# Patient Record
Sex: MALE | Race: WHITE | NOT HISPANIC OR LATINO | Employment: OTHER | URBAN - NONMETROPOLITAN AREA
[De-identification: names, ages, dates, MRNs, and addresses within clinical notes are randomized per-mention and may not be internally consistent; named-entity substitution may affect disease eponyms.]

---

## 2023-03-02 LAB — NATRIURETIC PEPTIDE B (PG/ML) IN SER/PLAS: 25 PG/ML (ref 0–99)

## 2023-04-04 LAB
ANION GAP IN SER/PLAS: 13 MMOL/L (ref 10–20)
BASOPHILS (10*3/UL) IN BLOOD BY AUTOMATED COUNT: 0.09 X10E9/L (ref 0–0.1)
BASOPHILS/100 LEUKOCYTES IN BLOOD BY AUTOMATED COUNT: 0.8 % (ref 0–2)
CALCIUM (MG/DL) IN SER/PLAS: 10.1 MG/DL (ref 8.6–10.3)
CARBON DIOXIDE, TOTAL (MMOL/L) IN SER/PLAS: 26 MMOL/L (ref 21–32)
CHLORIDE (MMOL/L) IN SER/PLAS: 100 MMOL/L (ref 98–107)
CREATININE (MG/DL) IN SER/PLAS: 0.93 MG/DL (ref 0.5–1.3)
EOSINOPHILS (10*3/UL) IN BLOOD BY AUTOMATED COUNT: 0.77 X10E9/L (ref 0–0.7)
EOSINOPHILS/100 LEUKOCYTES IN BLOOD BY AUTOMATED COUNT: 6.6 % (ref 0–6)
ERYTHROCYTE DISTRIBUTION WIDTH (RATIO) BY AUTOMATED COUNT: 13.2 % (ref 11.5–14.5)
ERYTHROCYTE MEAN CORPUSCULAR HEMOGLOBIN CONCENTRATION (G/DL) BY AUTOMATED: 31.8 G/DL (ref 32–36)
ERYTHROCYTE MEAN CORPUSCULAR VOLUME (FL) BY AUTOMATED COUNT: 89 FL (ref 80–100)
ERYTHROCYTES (10*6/UL) IN BLOOD BY AUTOMATED COUNT: 4.83 X10E12/L (ref 4.5–5.9)
GFR MALE: >90 ML/MIN/1.73M2
GLUCOSE (MG/DL) IN SER/PLAS: 246 MG/DL (ref 74–99)
HEMATOCRIT (%) IN BLOOD BY AUTOMATED COUNT: 43.1 % (ref 41–52)
HEMOGLOBIN (G/DL) IN BLOOD: 13.7 G/DL (ref 13.5–17.5)
IMMATURE GRANULOCYTES/100 LEUKOCYTES IN BLOOD BY AUTOMATED COUNT: 0.4 % (ref 0–0.9)
LEUKOCYTES (10*3/UL) IN BLOOD BY AUTOMATED COUNT: 11.6 X10E9/L (ref 4.4–11.3)
LYMPHOCYTES (10*3/UL) IN BLOOD BY AUTOMATED COUNT: 3.91 X10E9/L (ref 1.2–4.8)
LYMPHOCYTES/100 LEUKOCYTES IN BLOOD BY AUTOMATED COUNT: 33.6 % (ref 13–44)
MONOCYTES (10*3/UL) IN BLOOD BY AUTOMATED COUNT: 0.79 X10E9/L (ref 0.1–1)
MONOCYTES/100 LEUKOCYTES IN BLOOD BY AUTOMATED COUNT: 6.8 % (ref 2–10)
NEUTROPHILS (10*3/UL) IN BLOOD BY AUTOMATED COUNT: 6.03 X10E9/L (ref 1.2–7.7)
NEUTROPHILS/100 LEUKOCYTES IN BLOOD BY AUTOMATED COUNT: 51.8 % (ref 40–80)
PLATELETS (10*3/UL) IN BLOOD AUTOMATED COUNT: 237 X10E9/L (ref 150–450)
POTASSIUM (MMOL/L) IN SER/PLAS: 3.8 MMOL/L (ref 3.5–5.3)
SODIUM (MMOL/L) IN SER/PLAS: 135 MMOL/L (ref 136–145)
UREA NITROGEN (MG/DL) IN SER/PLAS: 19 MG/DL (ref 6–23)

## 2023-04-06 ENCOUNTER — HOSPITAL ENCOUNTER (OUTPATIENT)
Dept: DATA CONVERSION | Facility: HOSPITAL | Age: 63
End: 2023-04-06
Attending: STUDENT IN AN ORGANIZED HEALTH CARE EDUCATION/TRAINING PROGRAM
Payer: COMMERCIAL

## 2023-04-06 DIAGNOSIS — R06.09 OTHER FORMS OF DYSPNEA: ICD-10-CM

## 2023-04-06 DIAGNOSIS — I25.10 ATHEROSCLEROTIC HEART DISEASE OF NATIVE CORONARY ARTERY WITHOUT ANGINA PECTORIS: ICD-10-CM

## 2023-04-06 DIAGNOSIS — E66.9 OBESITY, UNSPECIFIED: ICD-10-CM

## 2023-04-06 DIAGNOSIS — R07.9 CHEST PAIN, UNSPECIFIED: ICD-10-CM

## 2023-04-10 DIAGNOSIS — E78.5 HYPERLIPIDEMIA, UNSPECIFIED HYPERLIPIDEMIA TYPE: ICD-10-CM

## 2023-04-10 RX ORDER — ATORVASTATIN CALCIUM 40 MG/1
40 TABLET, FILM COATED ORAL DAILY
COMMUNITY
End: 2023-04-10 | Stop reason: SDUPTHER

## 2023-04-10 RX ORDER — ATORVASTATIN CALCIUM 40 MG/1
40 TABLET, FILM COATED ORAL DAILY
Qty: 90 TABLET | Refills: 1 | Status: SHIPPED | OUTPATIENT
Start: 2023-04-10 | End: 2023-10-06

## 2023-04-11 DIAGNOSIS — I10 HTN (HYPERTENSION), BENIGN: Primary | ICD-10-CM

## 2023-04-17 RX ORDER — AMLODIPINE BESYLATE 10 MG/1
TABLET ORAL
Qty: 90 TABLET | Refills: 1 | Status: SHIPPED | OUTPATIENT
Start: 2023-04-17 | End: 2023-10-09

## 2023-04-25 DIAGNOSIS — I15.9 SECONDARY HYPERTENSION: Primary | ICD-10-CM

## 2023-04-25 RX ORDER — METOPROLOL SUCCINATE 50 MG/1
50 TABLET, EXTENDED RELEASE ORAL DAILY
Qty: 30 TABLET | Refills: 5 | Status: SHIPPED | OUTPATIENT
Start: 2023-04-25 | End: 2023-10-23 | Stop reason: SDUPTHER

## 2023-04-25 RX ORDER — HYDROCHLOROTHIAZIDE 25 MG/1
25 TABLET ORAL DAILY
Qty: 30 TABLET | Refills: 5 | Status: SHIPPED | OUTPATIENT
Start: 2023-04-25 | End: 2023-10-23 | Stop reason: SDUPTHER

## 2023-04-25 RX ORDER — METOPROLOL SUCCINATE 50 MG/1
50 TABLET, EXTENDED RELEASE ORAL DAILY
COMMUNITY
End: 2023-04-25 | Stop reason: SDUPTHER

## 2023-04-25 RX ORDER — HYDROCHLOROTHIAZIDE 25 MG/1
25 TABLET ORAL DAILY
COMMUNITY
End: 2023-04-25 | Stop reason: SDUPTHER

## 2023-05-09 DIAGNOSIS — E11.9 TYPE 2 DIABETES MELLITUS WITHOUT COMPLICATION, WITHOUT LONG-TERM CURRENT USE OF INSULIN (MULTI): Primary | ICD-10-CM

## 2023-05-09 RX ORDER — METFORMIN HYDROCHLORIDE 500 MG/1
2 TABLET ORAL 2 TIMES DAILY
COMMUNITY
Start: 2022-05-10 | End: 2023-05-09 | Stop reason: SDUPTHER

## 2023-05-09 RX ORDER — METFORMIN HYDROCHLORIDE 500 MG/1
1000 TABLET ORAL 2 TIMES DAILY
Qty: 270 TABLET | Refills: 1 | Status: SHIPPED | OUTPATIENT
Start: 2023-05-09 | End: 2023-09-18

## 2023-06-21 DIAGNOSIS — E11.9 TYPE 2 DIABETES MELLITUS WITHOUT COMPLICATION, WITHOUT LONG-TERM CURRENT USE OF INSULIN (MULTI): Primary | ICD-10-CM

## 2023-06-21 RX ORDER — GLIPIZIDE 5 MG/1
5 TABLET, FILM COATED, EXTENDED RELEASE ORAL NIGHTLY
COMMUNITY
End: 2023-06-21 | Stop reason: SDUPTHER

## 2023-06-21 RX ORDER — GLIPIZIDE 10 MG/1
10 TABLET, FILM COATED, EXTENDED RELEASE ORAL DAILY
Qty: 90 TABLET | Refills: 1 | Status: SHIPPED | OUTPATIENT
Start: 2023-06-21 | End: 2023-07-17 | Stop reason: ALTCHOICE

## 2023-06-21 RX ORDER — GLIPIZIDE 5 MG/1
5 TABLET, FILM COATED, EXTENDED RELEASE ORAL NIGHTLY
Qty: 90 TABLET | Refills: 0 | Status: SHIPPED | OUTPATIENT
Start: 2023-06-21 | End: 2023-07-17 | Stop reason: ALTCHOICE

## 2023-06-21 RX ORDER — GLIPIZIDE 10 MG/1
10 TABLET, FILM COATED, EXTENDED RELEASE ORAL DAILY
COMMUNITY
End: 2023-06-21 | Stop reason: SDUPTHER

## 2023-07-06 LAB
ANION GAP IN SER/PLAS: 12 MMOL/L (ref 10–20)
CALCIUM (MG/DL) IN SER/PLAS: 9.1 MG/DL (ref 8.6–10.3)
CARBON DIOXIDE, TOTAL (MMOL/L) IN SER/PLAS: 26 MMOL/L (ref 21–32)
CHLORIDE (MMOL/L) IN SER/PLAS: 102 MMOL/L (ref 98–107)
CHOLESTEROL (MG/DL) IN SER/PLAS: 135 MG/DL (ref 0–199)
CHOLESTEROL IN HDL (MG/DL) IN SER/PLAS: 44 MG/DL
CHOLESTEROL/HDL RATIO: 3.1
CREATININE (MG/DL) IN SER/PLAS: 0.81 MG/DL (ref 0.5–1.3)
ESTIMATED AVERAGE GLUCOSE FOR HBA1C: 206 MG/DL
GFR MALE: >90 ML/MIN/1.73M2
GLUCOSE (MG/DL) IN SER/PLAS: 183 MG/DL (ref 74–99)
HEMOGLOBIN A1C/HEMOGLOBIN TOTAL IN BLOOD: 8.8 %
LDL: 53 MG/DL (ref 0–99)
POTASSIUM (MMOL/L) IN SER/PLAS: 4 MMOL/L (ref 3.5–5.3)
SODIUM (MMOL/L) IN SER/PLAS: 136 MMOL/L (ref 136–145)
TRIGLYCERIDE (MG/DL) IN SER/PLAS: 191 MG/DL (ref 0–149)
UREA NITROGEN (MG/DL) IN SER/PLAS: 21 MG/DL (ref 6–23)
VLDL: 38 MG/DL (ref 0–40)

## 2023-07-11 ENCOUNTER — OFFICE VISIT (OUTPATIENT)
Dept: PRIMARY CARE | Facility: CLINIC | Age: 63
End: 2023-07-11
Payer: COMMERCIAL

## 2023-07-11 VITALS
HEIGHT: 70 IN | WEIGHT: 315 LBS | SYSTOLIC BLOOD PRESSURE: 132 MMHG | HEART RATE: 90 BPM | OXYGEN SATURATION: 96 % | BODY MASS INDEX: 45.1 KG/M2 | DIASTOLIC BLOOD PRESSURE: 80 MMHG

## 2023-07-11 DIAGNOSIS — E78.5 HYPERLIPIDEMIA LDL GOAL <70: ICD-10-CM

## 2023-07-11 DIAGNOSIS — E66.01 OBESITY, MORBID (MULTI): Primary | ICD-10-CM

## 2023-07-11 DIAGNOSIS — M79.89 LEFT LEG SWELLING: ICD-10-CM

## 2023-07-11 DIAGNOSIS — M25.562 ACUTE PAIN OF LEFT KNEE: ICD-10-CM

## 2023-07-11 DIAGNOSIS — E11.69 DIABETES MELLITUS TYPE 2 IN OBESE: ICD-10-CM

## 2023-07-11 DIAGNOSIS — G47.33 SEVERE OBSTRUCTIVE SLEEP APNEA: ICD-10-CM

## 2023-07-11 DIAGNOSIS — E66.9 DIABETES MELLITUS TYPE 2 IN OBESE: ICD-10-CM

## 2023-07-11 DIAGNOSIS — R06.00 DYSPNEA, UNSPECIFIED TYPE: ICD-10-CM

## 2023-07-11 DIAGNOSIS — I15.9 SECONDARY HYPERTENSION: ICD-10-CM

## 2023-07-11 DIAGNOSIS — M25.552 LEFT HIP PAIN: ICD-10-CM

## 2023-07-11 PROBLEM — R69 TAKING MEDICATION FOR CHRONIC DISEASE: Status: ACTIVE | Noted: 2023-07-11

## 2023-07-11 PROBLEM — K43.9 ABDOMINAL WALL HERNIA: Status: ACTIVE | Noted: 2023-07-11

## 2023-07-11 PROBLEM — R93.1 ELEVATED CORONARY ARTERY CALCIUM SCORE: Status: ACTIVE | Noted: 2023-07-11

## 2023-07-11 PROBLEM — I10 HTN (HYPERTENSION): Status: ACTIVE | Noted: 2023-07-11

## 2023-07-11 PROBLEM — U09.9 POST COVID-19 CONDITION, UNSPECIFIED: Status: ACTIVE | Noted: 2023-07-11

## 2023-07-11 PROCEDURE — 4010F ACE/ARB THERAPY RXD/TAKEN: CPT | Performed by: INTERNAL MEDICINE

## 2023-07-11 PROCEDURE — 3079F DIAST BP 80-89 MM HG: CPT | Performed by: INTERNAL MEDICINE

## 2023-07-11 PROCEDURE — 99214 OFFICE O/P EST MOD 30 MIN: CPT | Performed by: INTERNAL MEDICINE

## 2023-07-11 PROCEDURE — 3008F BODY MASS INDEX DOCD: CPT | Performed by: INTERNAL MEDICINE

## 2023-07-11 PROCEDURE — 3075F SYST BP GE 130 - 139MM HG: CPT | Performed by: INTERNAL MEDICINE

## 2023-07-11 PROCEDURE — 3052F HG A1C>EQUAL 8.0%<EQUAL 9.0%: CPT | Performed by: INTERNAL MEDICINE

## 2023-07-11 PROCEDURE — 1036F TOBACCO NON-USER: CPT | Performed by: INTERNAL MEDICINE

## 2023-07-11 RX ORDER — MULTIVITAMIN
1 TABLET ORAL DAILY
COMMUNITY
End: 2024-01-23 | Stop reason: SDUPTHER

## 2023-07-11 RX ORDER — LORATADINE 10 MG/1
1 CAPSULE, LIQUID FILLED ORAL DAILY
COMMUNITY

## 2023-07-11 RX ORDER — IBUPROFEN 200 MG
CAPSULE ORAL
COMMUNITY
Start: 2022-05-10

## 2023-07-11 RX ORDER — LOSARTAN POTASSIUM 50 MG/1
1 TABLET ORAL DAILY
COMMUNITY
End: 2023-09-18 | Stop reason: SDUPTHER

## 2023-07-11 ASSESSMENT — ENCOUNTER SYMPTOMS
BACK PAIN: 0
ARTHRALGIAS: 0
WHEEZING: 0
ABDOMINAL PAIN: 0
NAUSEA: 0
DIARRHEA: 0
VOMITING: 0
BLOOD IN STOOL: 0
CHEST TIGHTNESS: 0
SHORTNESS OF BREATH: 1
FATIGUE: 0
PALPITATIONS: 1
COUGH: 1
UNEXPECTED WEIGHT CHANGE: 0

## 2023-07-11 ASSESSMENT — PATIENT HEALTH QUESTIONNAIRE - PHQ9
SUM OF ALL RESPONSES TO PHQ9 QUESTIONS 1 AND 2: 0
1. LITTLE INTEREST OR PLEASURE IN DOING THINGS: NOT AT ALL
2. FEELING DOWN, DEPRESSED OR HOPELESS: NOT AT ALL

## 2023-07-11 NOTE — PATIENT INSTRUCTIONS
The staff will be scheduling a Doppler of your left lower leg today to make sure you do not have a blood clot.  If you have a blood clot you will stay at the hospital and be referred to a clinic called the Coumadin clinic to be placed on a blood thinner.  If you do not have a clot then you can leave and we will see you back to go over the results.  Please also remember to get x-rays of both your left hip and left knee which have already been ordered  The staff will be scheduling pulmonary function testing and I would like to see you back after completion  I sent a prescription to your pharmacy for a medication called Vinuvia to be taken once daily with the rest of your diabetic medications  Please call if this medication is not covered on your plan and please also contact your insurance company right away to get a list of approved medications  Because your hemoglobin A1c was high at 8.8 I would like for you to check your sugars twice daily before breakfast and before supper and write them down.  Please give me weekly updates  Bring your blood sugar log with you to your next follow-up appointment

## 2023-07-11 NOTE — ASSESSMENT & PLAN NOTE
-He indicates that he has been wearing his CPAP device faithfully and has derived benefit from its use

## 2023-07-11 NOTE — ASSESSMENT & PLAN NOTE
-Cholesterol overall is okay except for the high triglycerides  -We will work on lowering sugars and dietary modification  -He recently saw the dietitian  -He will continue with atorvastatin 40 mg daily

## 2023-07-11 NOTE — ASSESSMENT & PLAN NOTE
-Unfortunately hemoglobin A1c went up at 8.8 and our goal is to get closer to 7  -He recently saw dietitian and will be making modifications in his daily lifestyle habits  -He will continue taking metformin 500 mg 2 tablets twice daily  -He will continue with glipizide XL a total of 15 mg daily  -I am adding Januvia 100 mg daily and he will call if this is not well covered on his plan.  -He has been asked to check his sugars twice daily and give me weekly updates on his readings

## 2023-07-11 NOTE — ASSESSMENT & PLAN NOTE
-Blood pressure is currently adequately controlled  -He will continue with metoprolol succinate XL 50 mg daily  -Losartan 50 mg daily-  -

## 2023-07-11 NOTE — PROGRESS NOTES
Pt is here today for a 6 month check up,  review labs. C/O swelling in both feet and legs, left hip pain

## 2023-07-11 NOTE — ASSESSMENT & PLAN NOTE
-He had a recent thorough cardiac work-up including angiogram of his coronary vessels and he does not have severe disease to explain his dyspnea on exertion  -He has had COVID in the past  -We are setting him up for pulmonary function testing and we will see him back to go over the result

## 2023-07-11 NOTE — PROGRESS NOTES
Subjective   Patient ID: Qasim Simon is a 62 y.o. male who presents for No chief complaint on file..  HPI  He is here today for his routine 6-month checkup.  Since our last visit he had a thorough evaluation for his heart and ended up having a cardiac catheterization.  The good news is I did not find severe heart disease and has not felt that his dyspnea on exertion is secondary to his heart.  Of course we want to continue to aggressively modifying his risk factors with heart disease.  He also states he continues to have shortness of breath while at rest and also with exertion.  He states it all began when he contracted COVID-19 infection couple years ago.  I told him that the neck step would be to do pulmonary function testing and I have agreed to call him with results.  We talked about various possible causes of his shortness of breath including the possibility of reactive airways disease, the aftermath of COVID, his weight, and sometimes just deconditioning.  We also conducted a review of systems and we went over the results of recent lab work.  Unfortunately his hemoglobin A1c went up at 8.8.  We talked about the challenges of going to social events that center around food.  He has seen a dietitian recently and is planning on making modifications in his diet.  I am going to add Januvia and he will contact me if its not covered.  I am also asking that he give me regular updates on his blood glucose levels  We also reviewed his cholesterol profile and his triglycerides were high.  We talked about the association of hyperglycemia with high triglycerides.  He also presents today with swelling involving his left lower leg which is significantly different from his right.  He states it began recently and has been having pain in his left knee and hip.  I told him that because of the unilateral leg swelling we will be sending him for a venous Doppler to make sure he does not have a clot and we will also do x-rays  of the concern joints.  We will see him back in follow-up.  Review of Systems   Constitutional:  Negative for fatigue and unexpected weight change.   Respiratory:  Positive for cough and shortness of breath. Negative for chest tightness and wheezing.    Cardiovascular:  Positive for palpitations. Negative for chest pain and leg swelling.   Gastrointestinal:  Negative for abdominal pain, blood in stool, diarrhea, nausea and vomiting.   Musculoskeletal:  Negative for arthralgias and back pain.     Objective   Physical Exam  Vitals and nursing note reviewed.   Constitutional:       General: He is not in acute distress.     Appearance: Normal appearance.   HENT:      Head: Normocephalic and atraumatic.   Eyes:      Conjunctiva/sclera: Conjunctivae normal.   Cardiovascular:      Rate and Rhythm: Normal rate and regular rhythm.      Heart sounds: Normal heart sounds.   Pulmonary:      Effort: No respiratory distress.      Breath sounds: No wheezing.   Abdominal:      Palpations: Abdomen is soft.      Tenderness: There is no abdominal tenderness. There is no guarding.   Musculoskeletal:         General: No swelling. Normal range of motion.   Skin:     General: Skin is warm and dry.   Neurological:      General: No focal deficit present.      Mental Status: He is alert and oriented to person, place, and time.   Psychiatric:         Behavior: Behavior normal.       Recent Results (from the past 672 hour(s))   Lipid Panel    Collection Time: 07/06/23  8:41 AM   Result Value Ref Range    Cholesterol 135 0 - 199 mg/dL    HDL 44.0 mg/dL    Cholesterol/HDL Ratio 3.1     LDL 53 0 - 99 mg/dL    VLDL 38 0 - 40 mg/dL    Triglycerides 191 (H) 0 - 149 mg/dL   Basic Metabolic Panel    Collection Time: 07/06/23  8:41 AM   Result Value Ref Range    Glucose 183 (H) 74 - 99 mg/dL    Sodium 136 136 - 145 mmol/L    Potassium 4.0 3.5 - 5.3 mmol/L    Chloride 102 98 - 107 mmol/L    Bicarbonate 26 21 - 32 mmol/L    Anion Gap 12 10 - 20 mmol/L     Urea Nitrogen 21 6 - 23 mg/dL    Creatinine 0.81 0.50 - 1.30 mg/dL    GFR MALE >90 >90 mL/min/1.73m2    Calcium 9.1 8.6 - 10.3 mg/dL   Hemoglobin A1C    Collection Time: 07/06/23  8:41 AM   Result Value Ref Range    Hemoglobin A1C 8.8 (A) %    Estimated Average Glucose 206 MG/DL       Assessment/Plan   Problem List Items Addressed This Visit       Severe obstructive sleep apnea     -He indicates that he has been wearing his CPAP device faithfully and has derived benefit from its use         Hyperlipidemia LDL goal <70     -Cholesterol overall is okay except for the high triglycerides  -We will work on lowering sugars and dietary modification  -He recently saw the dietitian  -He will continue with atorvastatin 40 mg daily         HTN (hypertension)     -Blood pressure is currently adequately controlled  -He will continue with metoprolol succinate XL 50 mg daily  -Losartan 50 mg daily-  -           Obesity, morbid (CMS/HCC) - Primary    Diabetes mellitus type 2 in obese (CMS/[facility])     -Unfortunately hemoglobin A1c went up at 8.8 and our goal is to get closer to 7  -He recently saw dietitian and will be making modifications in his daily lifestyle habits  -He will continue taking metformin 500 mg 2 tablets twice daily  -He will continue with glipizide XL a total of 15 mg daily  -I am adding Januvia 100 mg daily and he will call if this is not well covered on his plan.  -He has been asked to check his sugars twice daily and give me weekly updates on his readings         Relevant Medications    SITagliptin phosphate (Januvia) 100 mg tablet    Other Relevant Orders    Basic Metabolic Panel    Hemoglobin A1C    Left leg swelling     -He will be sent for a venous Doppler to make sure he does not have a blood clot         Relevant Orders    Vascular US lower extremity venous duplex bilateral    Acute pain of left knee     -I am sending him for an x-ray we will see him back to go over the result         Relevant Orders    XR  knee left 1-2 views    Left hip pain     -I am sending him for an x-ray we will see him back to go the result         Relevant Orders    XR hip left 2 or 3 views    Dyspnea     -He had a recent thorough cardiac work-up including angiogram of his coronary vessels and he does not have severe disease to explain his dyspnea on exertion  -He has had COVID in the past  -We are setting him up for pulmonary function testing and we will see him back to go over the result         Relevant Orders    Pulmonary function testing (Ancillary Performed)          Ada Juarez DO

## 2023-07-13 ENCOUNTER — PATIENT MESSAGE (OUTPATIENT)
Dept: PRIMARY CARE | Facility: CLINIC | Age: 63
End: 2023-07-13
Payer: COMMERCIAL

## 2023-07-13 ENCOUNTER — TELEPHONE (OUTPATIENT)
Dept: PRIMARY CARE | Facility: CLINIC | Age: 63
End: 2023-07-13
Payer: COMMERCIAL

## 2023-07-13 DIAGNOSIS — M25.562 ACUTE PAIN OF LEFT KNEE: ICD-10-CM

## 2023-07-13 DIAGNOSIS — M25.552 LEFT HIP PAIN: Primary | ICD-10-CM

## 2023-07-13 NOTE — TELEPHONE ENCOUNTER
Pt called stating that he is not able to afford the Juniva and would like for you to send him something else in. Thank you.

## 2023-07-13 NOTE — TELEPHONE ENCOUNTER
Pt states that he spoke with his insurance and that they do not have anything else that would be compatible with this medication that they will pay. Pt states that he feels that he also doesn't need to have a PFT done again. Its quite costly for him and he states that he doesn't feel that anything has changed in the last year. He actually feels better now that he did before. He also wants to know if he could do PT first instead of going to ortho.

## 2023-07-13 NOTE — TELEPHONE ENCOUNTER
Please advise that I am sorry to hear that and the neck step is to try to get a list of approved medications through his insurance plan so that we can select one that is affordable.

## 2023-07-13 NOTE — TELEPHONE ENCOUNTER
I called and left a detailed message because I confirmed that it was his voicemail  I explained that it was very frustrating that the insurance company does not give him more direction on improved diabetic medications and asked that he try to look into a little bit more  I also told him he did not have to have pulmonary function testing if he did not want them but appear to have been a year since his last checkup and he did not seem to be getting better  I will go ahead and order physical therapy for his joint pain and I told him that if he does not get better then we can have him see orthopedics  I of course told him that if he wanted to speak to me directly I be happy to do so but I did let him know that I will be out of the office tomorrow.  Thank you

## 2023-07-14 NOTE — TELEPHONE ENCOUNTER
Pt called wondering if you would be will to let him go back to taking the glipizide 10mg in the Am and 10mg in the PM.. He states that you just recently changed it to 10mg in the AM and 5mg in the PM instead of doing the januvia.

## 2023-07-17 DIAGNOSIS — E11.9 TYPE 2 DIABETES MELLITUS WITHOUT COMPLICATION, WITHOUT LONG-TERM CURRENT USE OF INSULIN (MULTI): ICD-10-CM

## 2023-07-17 RX ORDER — GLIPIZIDE 10 MG/1
10 TABLET, FILM COATED, EXTENDED RELEASE ORAL 2 TIMES DAILY
Qty: 180 TABLET | Refills: 1 | Status: SHIPPED | OUTPATIENT
Start: 2023-07-17 | End: 2024-01-23 | Stop reason: SDUPTHER

## 2023-09-06 VITALS — WEIGHT: 315 LBS | BODY MASS INDEX: 45.1 KG/M2 | HEIGHT: 70 IN

## 2023-09-10 DIAGNOSIS — E11.9 TYPE 2 DIABETES MELLITUS WITHOUT COMPLICATION, WITHOUT LONG-TERM CURRENT USE OF INSULIN (MULTI): ICD-10-CM

## 2023-09-10 DIAGNOSIS — I15.9 SECONDARY HYPERTENSION: ICD-10-CM

## 2023-09-14 NOTE — H&P
History & Physical Reviewed:   I have reviewed the History and Physical dated:  06-Apr-2023   History and Physical reviewed and relevant findings noted. Patient examined to review pertinent physical  findings.: No significant changes   Home Medications Reviewed: no changes noted   Allergies Reviewed: no changes noted       Airway/Sedation Assessment:  ·  Emotional Status anxious   ·  Neurologic alert & oriented x 3   ·  Respiratory clear to auscultation   ·  Cardiovascular rhythm & rate regular   ·  GI/ soft, nontender     · Pulses present: Pedal Left, Pedal Right, Radial Left, Radial Right     ·  Mouth Opening OK yes   ·  Neck Flexibility OK yes   ·  Loose Teeth no     Oropharyngeal Classification:          ·  Oropharyngeal Classification Class III   ·  ASA PS Classification ASA II   ·  Sedation Plan light sedation       ERAS (Enhanced Recovery After Surgery):  ·  ERAS Patient: no     Consent:   COVID-19 Consent:  ·  COVID-19 Risk Consent Surgeon has reviewed key risks related to the risk of kassi COVID-19 and if they contract COVID-19 what the risks are.       Electronic Signatures:  Ruma Ibarra (APRN-CNP)  (Signed 06-Apr-2023 07:48)   Authored: History & Physical Reviewed, Airway/Sedation,  ERAS, Consent, Note Completion  Shaji Ortiz)  (Signed 06-Apr-2023 08:27)   Authored: History & Physical Reviewed   Co-Signer: History & Physical Reviewed, Airway/Sedation, ERAS, Consent, Note Completion      Last Updated: 06-Apr-2023 08:27 by Shaji Ortiz)

## 2023-09-18 RX ORDER — METFORMIN HYDROCHLORIDE 500 MG/1
1000 TABLET ORAL 2 TIMES DAILY
Qty: 270 TABLET | Refills: 0 | Status: SHIPPED | OUTPATIENT
Start: 2023-09-18 | End: 2023-11-27

## 2023-09-18 RX ORDER — LOSARTAN POTASSIUM 50 MG/1
50 TABLET ORAL DAILY
Qty: 90 TABLET | Refills: 1 | Status: SHIPPED | OUTPATIENT
Start: 2023-09-18 | End: 2024-01-23 | Stop reason: SDUPTHER

## 2023-10-02 DIAGNOSIS — I10 HTN (HYPERTENSION), BENIGN: ICD-10-CM

## 2023-10-03 DIAGNOSIS — E78.5 HYPERLIPIDEMIA, UNSPECIFIED HYPERLIPIDEMIA TYPE: ICD-10-CM

## 2023-10-06 ENCOUNTER — LAB (OUTPATIENT)
Dept: LAB | Facility: LAB | Age: 63
End: 2023-10-06
Payer: COMMERCIAL

## 2023-10-06 DIAGNOSIS — E66.9 DIABETES MELLITUS TYPE 2 IN OBESE: ICD-10-CM

## 2023-10-06 DIAGNOSIS — E11.69 DIABETES MELLITUS TYPE 2 IN OBESE: ICD-10-CM

## 2023-10-06 LAB
ANION GAP SERPL CALC-SCNC: 14 MMOL/L (ref 10–20)
BUN SERPL-MCNC: 19 MG/DL (ref 6–23)
CALCIUM SERPL-MCNC: 10 MG/DL (ref 8.6–10.3)
CHLORIDE SERPL-SCNC: 102 MMOL/L (ref 98–107)
CO2 SERPL-SCNC: 27 MMOL/L (ref 21–32)
CREAT SERPL-MCNC: 1.01 MG/DL (ref 0.5–1.3)
EST. AVERAGE GLUCOSE BLD GHB EST-MCNC: 151 MG/DL
GFR SERPL CREATININE-BSD FRML MDRD: 84 ML/MIN/1.73M*2
GLUCOSE SERPL-MCNC: 108 MG/DL (ref 74–99)
HBA1C MFR BLD: 6.9 %
POTASSIUM SERPL-SCNC: 4.1 MMOL/L (ref 3.5–5.3)
SODIUM SERPL-SCNC: 139 MMOL/L (ref 136–145)

## 2023-10-06 PROCEDURE — 80048 BASIC METABOLIC PNL TOTAL CA: CPT

## 2023-10-06 PROCEDURE — 36415 COLL VENOUS BLD VENIPUNCTURE: CPT

## 2023-10-06 PROCEDURE — 83036 HEMOGLOBIN GLYCOSYLATED A1C: CPT

## 2023-10-06 RX ORDER — ATORVASTATIN CALCIUM 40 MG/1
40 TABLET, FILM COATED ORAL DAILY
Qty: 90 TABLET | Refills: 1 | Status: SHIPPED | OUTPATIENT
Start: 2023-10-06 | End: 2024-01-23 | Stop reason: SDUPTHER

## 2023-10-09 RX ORDER — AMLODIPINE BESYLATE 10 MG/1
TABLET ORAL
Qty: 90 TABLET | Refills: 1 | Status: SHIPPED | OUTPATIENT
Start: 2023-10-09 | End: 2024-01-23 | Stop reason: SDUPTHER

## 2023-10-13 ENCOUNTER — OFFICE VISIT (OUTPATIENT)
Dept: PRIMARY CARE | Facility: CLINIC | Age: 63
End: 2023-10-13
Payer: COMMERCIAL

## 2023-10-13 VITALS
BODY MASS INDEX: 45.1 KG/M2 | WEIGHT: 315 LBS | HEIGHT: 70 IN | DIASTOLIC BLOOD PRESSURE: 78 MMHG | SYSTOLIC BLOOD PRESSURE: 126 MMHG

## 2023-10-13 DIAGNOSIS — Z23 ENCOUNTER FOR IMMUNIZATION: Primary | ICD-10-CM

## 2023-10-13 DIAGNOSIS — I15.9 SECONDARY HYPERTENSION: ICD-10-CM

## 2023-10-13 DIAGNOSIS — Z00.00 MEDICARE ANNUAL WELLNESS VISIT, SUBSEQUENT: ICD-10-CM

## 2023-10-13 DIAGNOSIS — E11.69 DIABETES MELLITUS TYPE 2 IN OBESE: ICD-10-CM

## 2023-10-13 DIAGNOSIS — E66.01 OBESITY, MORBID (MULTI): ICD-10-CM

## 2023-10-13 DIAGNOSIS — E66.9 DIABETES MELLITUS TYPE 2 IN OBESE: ICD-10-CM

## 2023-10-13 PROCEDURE — 3078F DIAST BP <80 MM HG: CPT | Performed by: INTERNAL MEDICINE

## 2023-10-13 PROCEDURE — 4010F ACE/ARB THERAPY RXD/TAKEN: CPT | Performed by: INTERNAL MEDICINE

## 2023-10-13 PROCEDURE — G0439 PPPS, SUBSEQ VISIT: HCPCS | Performed by: INTERNAL MEDICINE

## 2023-10-13 PROCEDURE — 3044F HG A1C LEVEL LT 7.0%: CPT | Performed by: INTERNAL MEDICINE

## 2023-10-13 PROCEDURE — 99214 OFFICE O/P EST MOD 30 MIN: CPT | Performed by: INTERNAL MEDICINE

## 2023-10-13 PROCEDURE — 3074F SYST BP LT 130 MM HG: CPT | Performed by: INTERNAL MEDICINE

## 2023-10-13 PROCEDURE — G0008 ADMIN INFLUENZA VIRUS VAC: HCPCS | Performed by: INTERNAL MEDICINE

## 2023-10-13 PROCEDURE — 3075F SYST BP GE 130 - 139MM HG: CPT | Performed by: INTERNAL MEDICINE

## 2023-10-13 PROCEDURE — 90686 IIV4 VACC NO PRSV 0.5 ML IM: CPT | Performed by: INTERNAL MEDICINE

## 2023-10-13 PROCEDURE — 3008F BODY MASS INDEX DOCD: CPT | Performed by: INTERNAL MEDICINE

## 2023-10-13 PROCEDURE — 1036F TOBACCO NON-USER: CPT | Performed by: INTERNAL MEDICINE

## 2023-10-13 ASSESSMENT — ENCOUNTER SYMPTOMS
WHEEZING: 0
FATIGUE: 0
BACK PAIN: 0
ARTHRALGIAS: 0
PALPITATIONS: 0
COUGH: 0
VOMITING: 0
SHORTNESS OF BREATH: 0
DIARRHEA: 0
NAUSEA: 0
ABDOMINAL PAIN: 0
BLOOD IN STOOL: 0

## 2023-10-13 ASSESSMENT — ACTIVITIES OF DAILY LIVING (ADL)
TAKING_MEDICATION: INDEPENDENT
GROCERY_SHOPPING: INDEPENDENT
DOING_HOUSEWORK: INDEPENDENT
MANAGING_FINANCES: INDEPENDENT
BATHING: INDEPENDENT
DRESSING: INDEPENDENT

## 2023-10-13 ASSESSMENT — PATIENT HEALTH QUESTIONNAIRE - PHQ9
1. LITTLE INTEREST OR PLEASURE IN DOING THINGS: NOT AT ALL
SUM OF ALL RESPONSES TO PHQ9 QUESTIONS 1 AND 2: 0
2. FEELING DOWN, DEPRESSED OR HOPELESS: NOT AT ALL

## 2023-10-13 NOTE — PATIENT INSTRUCTIONS
As we discussed I am extremely pleased with your progress and please keep up the great work  You are still welcome to notify me with your blood glucose levels and please remember that as you continue to lose weight your medication requirements might decrease  Please remember to put grab bars in your bathroom  Please remember to complete advance directives which includes a living will and power of  for healthcare.  These can be done through an  but you can also print these documents online and have them notarized  We will see you back in 3 months and please remember to get fasting lab work done prior to that visit  If you can track down the date of your last pneumonia vaccine please let us now

## 2023-10-13 NOTE — ASSESSMENT & PLAN NOTE
-Blood pressure is currently well controlled  -He will remain on amlodipine 10 mg daily  -Hydrochlorothiazide 25 mg daily  -Metoprolol XL 50 mg daily

## 2023-10-13 NOTE — ASSESSMENT & PLAN NOTE
-Hemoglobin A1c was excellent at 6.9 this time  -We we will see him back in 3 months per his request and he will continue to update me with blood glucose readings

## 2023-10-13 NOTE — PROGRESS NOTES
"Subjective   Reason for Visit: Qasim Simon is an 62 y.o. male here for a Medicare Wellness visit.     Reviewed all medications by prescribing practitioner or clinical pharmacist (such as prescriptions, OTCs, herbal therapies and supplements) and documented in the medical record.    HPI  He is here today for his general checkup and is accompanied by his wife.  He has done a fantastic job and monitoring his sugars closely and watching his diet.  He has been providing regular updates on his blood glucose levels.  We also discovered that today he is due for his annual Medicare wellness visit which we have also completed.  We did go through the Medicare questionnaire.  He denies any symptoms of depression.  He has had no falls in the last year.  We talked about fall prevention and I have specifically recommended he put grab bars in the bathroom.  He also continues to be very active and in fact he has been focusing on losing weight.  Since his weight loss journey he is lost approximately 28 pounds and I think that is of course excellent.  He states he starting to feel better.  We also discussed the importance of establishing advanced directives including living will and power of  for healthcare.  We also reviewed his most recent laboratory test results.  He has managed to drop his hemoglobin A1c from 8.8 down to 6.9.  I told him I thought this was excellent and we will not escalate any medication doses..  We did discuss the fact that if he continues to lose weight we might actually be able to reduce medication doses.  Time will tell.  We also discussed being careful about trying to \"tighten up \"control of the blood sugars because we could run into risky hypoglycemic events.  He would like to return in 3 months as he likes to maintain the accountability for his health style practices.  Patient Care Team:  Ada Juarez DO as PCP - General  Ada Juarez DO as PCP - [facility] [employer] " "PCP     Review of Systems   Constitutional:  Negative for fatigue.   Respiratory:  Negative for cough, shortness of breath and wheezing.         MIRAMONTES stable   Cardiovascular:  Negative for chest pain, palpitations and leg swelling.   Gastrointestinal:  Negative for abdominal pain, blood in stool, diarrhea, nausea and vomiting.   Musculoskeletal:  Negative for arthralgias and back pain.       Objective   Vitals:  /78   Ht 1.778 m (5' 10\")   Wt (!) 154 kg (339 lb)   BMI 48.64 kg/m²       Physical Exam  Vitals and nursing note reviewed.   Constitutional:       General: He is not in acute distress.     Appearance: Normal appearance.   HENT:      Head: Normocephalic and atraumatic.   Eyes:      Conjunctiva/sclera: Conjunctivae normal.   Cardiovascular:      Rate and Rhythm: Normal rate and regular rhythm.      Heart sounds: Normal heart sounds.   Pulmonary:      Effort: No respiratory distress.      Breath sounds: No wheezing.   Abdominal:      Palpations: Abdomen is soft.      Tenderness: There is no abdominal tenderness. There is no guarding.   Musculoskeletal:         General: No swelling. Normal range of motion.   Skin:     General: Skin is warm and dry.   Neurological:      General: No focal deficit present.      Mental Status: He is alert and oriented to person, place, and time.   Psychiatric:         Behavior: Behavior normal.       Recent Results (from the past 672 hour(s))   Basic Metabolic Panel    Collection Time: 10/06/23  7:30 AM   Result Value Ref Range    Glucose 108 (H) 74 - 99 mg/dL    Sodium 139 136 - 145 mmol/L    Potassium 4.1 3.5 - 5.3 mmol/L    Chloride 102 98 - 107 mmol/L    Bicarbonate 27 21 - 32 mmol/L    Anion Gap 14 10 - 20 mmol/L    Urea Nitrogen 19 6 - 23 mg/dL    Creatinine 1.01 0.50 - 1.30 mg/dL    eGFR 84 >60 mL/min/1.73m*2    Calcium 10.0 8.6 - 10.3 mg/dL   Hemoglobin A1C    Collection Time: 10/06/23  7:30 AM   Result Value Ref Range    Hemoglobin A1C 6.9 (H) see below %    " Estimated Average Glucose 151 Not Established mg/dL         Assessment/Plan   Problem List Items Addressed This Visit       HTN (hypertension)     -Blood pressure is currently well controlled  -He will remain on amlodipine 10 mg daily  -Hydrochlorothiazide 25 mg daily  -Metoprolol XL 50 mg daily         Obesity, morbid (CMS/[facility])     -He has done a fantastic job of losing 28 pounds over the past several months and we talked about eating sensibly and regularly during his weight loss journey         Diabetes mellitus type 2 in obese (CMS/[facility])     -Hemoglobin A1c was excellent at 6.9 this time  -We we will see him back in 3 months per his request and he will continue to update me with blood glucose readings         Relevant Orders    Basic Metabolic Panel    Hemoglobin A1C    Medicare annual wellness visit, subsequent - Primary     -I have specifically recommended he put grab bars in the bathroom  -That he establish advanced directives and provide a copy here

## 2023-10-13 NOTE — ASSESSMENT & PLAN NOTE
-He has done a fantastic job of losing 28 pounds over the past several months and we talked about eating sensibly and regularly during his weight loss journey

## 2023-10-13 NOTE — ASSESSMENT & PLAN NOTE
-I have specifically recommended he put grab bars in the bathroom  -That he establish advanced directives and provide a copy here

## 2023-10-23 DIAGNOSIS — I15.9 SECONDARY HYPERTENSION: ICD-10-CM

## 2023-10-23 RX ORDER — METOPROLOL SUCCINATE 50 MG/1
50 TABLET, EXTENDED RELEASE ORAL DAILY
Qty: 30 TABLET | Refills: 5 | Status: SHIPPED | OUTPATIENT
Start: 2023-10-23 | End: 2024-04-16

## 2023-10-23 RX ORDER — HYDROCHLOROTHIAZIDE 25 MG/1
25 TABLET ORAL DAILY
Qty: 30 TABLET | Refills: 5 | Status: SHIPPED | OUTPATIENT
Start: 2023-10-23 | End: 2024-01-23 | Stop reason: SDUPTHER

## 2023-11-06 ENCOUNTER — OFFICE VISIT (OUTPATIENT)
Dept: CARDIOLOGY | Facility: CLINIC | Age: 63
End: 2023-11-06
Payer: COMMERCIAL

## 2023-11-06 ENCOUNTER — APPOINTMENT (OUTPATIENT)
Dept: CARDIOLOGY | Facility: CLINIC | Age: 63
End: 2023-11-06
Payer: COMMERCIAL

## 2023-11-06 VITALS
SYSTOLIC BLOOD PRESSURE: 134 MMHG | OXYGEN SATURATION: 96 % | WEIGHT: 315 LBS | DIASTOLIC BLOOD PRESSURE: 64 MMHG | HEIGHT: 70 IN | BODY MASS INDEX: 45.1 KG/M2 | HEART RATE: 81 BPM

## 2023-11-06 DIAGNOSIS — I70.90 ATHEROSCLEROSIS: Primary | ICD-10-CM

## 2023-11-06 PROCEDURE — 3078F DIAST BP <80 MM HG: CPT | Performed by: STUDENT IN AN ORGANIZED HEALTH CARE EDUCATION/TRAINING PROGRAM

## 2023-11-06 PROCEDURE — 99214 OFFICE O/P EST MOD 30 MIN: CPT | Performed by: STUDENT IN AN ORGANIZED HEALTH CARE EDUCATION/TRAINING PROGRAM

## 2023-11-06 PROCEDURE — 3044F HG A1C LEVEL LT 7.0%: CPT | Performed by: STUDENT IN AN ORGANIZED HEALTH CARE EDUCATION/TRAINING PROGRAM

## 2023-11-06 PROCEDURE — 3008F BODY MASS INDEX DOCD: CPT | Performed by: STUDENT IN AN ORGANIZED HEALTH CARE EDUCATION/TRAINING PROGRAM

## 2023-11-06 PROCEDURE — 4010F ACE/ARB THERAPY RXD/TAKEN: CPT | Performed by: STUDENT IN AN ORGANIZED HEALTH CARE EDUCATION/TRAINING PROGRAM

## 2023-11-06 PROCEDURE — 1036F TOBACCO NON-USER: CPT | Performed by: STUDENT IN AN ORGANIZED HEALTH CARE EDUCATION/TRAINING PROGRAM

## 2023-11-06 PROCEDURE — 3075F SYST BP GE 130 - 139MM HG: CPT | Performed by: STUDENT IN AN ORGANIZED HEALTH CARE EDUCATION/TRAINING PROGRAM

## 2023-11-06 NOTE — PROGRESS NOTES
Chief Complaint   Patient presents with    6 month f/u     HPI:  I was asked by Dr. Juarez to evaluate this patient in consultation for dyspnea.     Per chart review, Mr. Simon is a 63-year-old non-smoker diabetic obese male with prior medical history significant for hypertension, diabetes, hyperlipidemia, who is being evaluated for dyspnea on exertion. Patient states that he has been feeling shortness of breath on exertion for the last 3 years after he had COVID. His shortness of breath is associated with exertion and used to be worse 3 years ago when he could not walk inside his house without feeling it along with tachycardia. From July 2021 to January 2022, he needed oxygen at home but has improved with afterwards and no longer uses it. He states that he is still feels shortness of breath and tachycardia on exertion, that has improved from the time that he was in need of oxygen but still exists. Patient uses BiPAP by night and reports that he feels much better while on BIPAP. His morbid obesity indeed contributes to this shortness of breath. The patient states that he has been seen pulmonologist that agrees that the shortness of breath has respiratory cause but may be also associated with myocardial ischemia. The patient also reports leg swelling. The patient denies chest pain, syncope, abdominal pain, lightheadedness.  His EKG shows signs that may be suggestive of anteroseptal infarct.  He has a elevated calcium score of 1375.  Patient states that he had an echo performed in 2021 but I cannot find the results here in the chart or in the system.  Patient states that he is mother had a heart attack at the age of late 60s and his father also had cardiovascular disease at the age of 84.  Patient returns today for a follow up visit. Echo is normal left atrial ejection fraction of 60% with no abnormal wall abnormalities.  His nuclear stress test is showing normal tracing but with mild reversible defect along the  basal anterior wall which completely corrects on attenuated images. Therefore, this image and is suggestive of artifact, However, some aspects of mild ischemia cannot be definitely excluded.  His left heart catheterization on 04/2023 showed non-obstructive coronary artery disease.    Patient returns today stating that he has lost 30 lb over the past 6 months. He feels more active, but his pain in his L hip is impairing him form exercising more. He had a surgical procedure for the R hip and may need surgery for the Left side as well. He still complains of some SOB on exertion, but he believes that this will improve once he gets in shape. He denies chest pain, palpitations, leg edema, lightheadedness, headaches, fever, chills, orthopnea, paroxysmal nocturnal dyspnea or syncope.     Past Medical History  History reviewed. No pertinent past medical history.    Past Surgical History  Past Surgical History:   Procedure Laterality Date    CARDIAC CATHETERIZATION      OTHER SURGICAL HISTORY  04/19/2022    Hip surgery    OTHER SURGICAL HISTORY  04/19/2022    Cholecystectomy       Past Family History  No family history on file.    Allergy History  Allergies   Allergen Reactions    Beeswax Swelling     bees       Past Social History  Social History     Socioeconomic History    Marital status:      Spouse name: None    Number of children: None    Years of education: None    Highest education level: None   Occupational History    None   Tobacco Use    Smoking status: Never     Passive exposure: Past    Smokeless tobacco: Never   Vaping Use    Vaping Use: Never used   Substance and Sexual Activity    Alcohol use: Not Currently    Drug use: Not Currently    Sexual activity: None   Other Topics Concern    None   Social History Narrative    None     Social Determinants of Health     Financial Resource Strain: Not on file   Food Insecurity: Not on file   Transportation Needs: Not on file   Physical Activity: Not on file  "  Stress: Not on file   Social Connections: Not on file   Intimate Partner Violence: Not on file   Housing Stability: Not on file       Social History     Tobacco Use   Smoking Status Never    Passive exposure: Past   Smokeless Tobacco Never       Review of Systems:  Constitutional: feeling tired, but~not feeling poorly,~no fever~and~no chills.   ENT: no hearing loss,~no earache~and~no sore throat.   Cardiovascular: palpitations~and~lower extremity edema, but~no intermittent leg claudication~and~no chest pain.   Respiratory: shortness of breath~and~shortness of breath during exertion, but~no cough~and~no wheezing.   Gastrointestinal: no change in bowel habits,~no blood in stools,~no diarrhea,~no constipation~and~no nausea.   Genitourinary: no dysuria,~no incontinence~and~no urinary hesitancy.   Neurological: no seizures~and~no frequent falls.     Objective Data:  Last Recorded Vitals:  Vitals:    11/06/23 1303   BP: 134/64   Pulse: 81   SpO2: 96%   Weight: (!) 153 kg (338 lb 6.4 oz)   Height: 1.778 m (5' 10\")       Last Labs:  CBC - 4/4/2023:  8:45 AM  11.6 13.7 237    43.1      CMP - 10/6/2023:  7:30 AM  10.0 _ _ --- _   _ _ _ _      PTT - No results in last year.  _   _ _     BNP   Date/Time Value Ref Range Status   03/02/2023 03:19 PM 25 0 - 99 pg/mL Final     Comment:     .  <100 pg/mL - Heart failure unlikely  100-299 pg/mL - Intermediate probability of acute heart  .               failure exacerbation. Correlate with clinical  .               context and patient history.    >=300 pg/mL - Heart Failure likely. Correlate with clinical  .               context and patient history.  BNP testing is performed using different testing   methodology at [facility] than at other   [facility] hospitals. Direct result comparisons should   only be made within the same method.       HGBA1C   Date/Time Value Ref Range Status   10/06/2023 07:30 AM 6.9 see below % Final   07/06/2023 08:41 AM 8.8 % Final     Comment: "          Diagnosis of Diabetes-Adults   Non-Diabetic: < or = 5.6%   Increased risk for developing diabetes: 5.7-6.4%   Diagnostic of diabetes: > or = 6.5%  .       Monitoring of Diabetes                Age (y)     Therapeutic Goal (%)   Adults:          >18           <7.0   Pediatrics:    13-18           <7.5                   7-12           <8.0                   0- 6            7.5-8.5   American Diabetes Association. Diabetes Care 33(S1), Jan 2010.     01/10/2023 08:44 AM 7.4 % Final     Comment:          Diagnosis of Diabetes-Adults   Non-Diabetic: < or = 5.6%   Increased risk for developing diabetes: 5.7-6.4%   Diagnostic of diabetes: > or = 6.5%  .       Monitoring of Diabetes                Age (y)     Therapeutic Goal (%)   Adults:          >18           <7.0   Pediatrics:    13-18           <7.5                   7-12           <8.0                   0- 6            7.5-8.5   American Diabetes Association. Diabetes Care 33(S1), Jan 2010.       VLDL   Date/Time Value Ref Range Status   07/06/2023 08:41 AM 38 0 - 40 mg/dL Final   08/01/2022 07:48 AM 22 0 - 40 mg/dL Final   04/20/2022 09:22 AM 36 0 - 40 mg/dL Final        Patient Medications:  Outpatient Encounter Medications as of 11/6/2023   Medication Sig Dispense Refill    amLODIPine (Norvasc) 10 mg tablet Take 1 tablet by mouth once daily 90 tablet 1    atorvastatin (Lipitor) 40 mg tablet Take 1 tablet by mouth once daily 90 tablet 1    blood sugar diagnostic (Blood Glucose Test) strip USE TO TEST BLOOD SUGAR TWICE DAILY, DX E11.9      fish oil concentrate (Omega-3) 120-180 mg capsule Take 1 capsule (1 g) by mouth once daily.      glipiZIDE XL (Glucotrol XL) 10 mg 24 hr tablet Take 1 tablet (10 mg) by mouth 2 times a day. 180 tablet 1    hydroCHLOROthiazide (HYDRODiuril) 25 mg tablet Take 1 tablet (25 mg) by mouth once daily. 30 tablet 5    loratadine (Claritin Liqui-Gel) 10 mg capsule Take 1 capsule by mouth once daily.      losartan (Cozaar) 50 mg  tablet Take 1 tablet (50 mg) by mouth once daily. 90 tablet 1    metFORMIN (Glucophage) 500 mg tablet Take 2 tablets by mouth twice daily 270 tablet 0    metoprolol succinate XL (Toprol-XL) 50 mg 24 hr tablet Take 1 tablet (50 mg) by mouth once daily. 30 tablet 5    multivitamin tablet Take 1 tablet by mouth once daily.       No facility-administered encounter medications on file as of 11/6/2023.       Physical Exam:  Constitutional:  appears healthy,~morbidly obese,~well hydrated~and~appearance reflects stated age.   Neck: neck is supple, symmetric, trachea midline, no masses ~and~no thyromegaly .   Pulmonary: no increased work of breathing or signs of respiratory distress ~and~lungs clear to auscultation.    Cardiovascular: carotid pulses 2+ bilaterally with no bruit ,~JVP was normal,~no thrills ,~regular rhythm, normal S1 and S2, no murmurs ,~pedal pulses 2+ bilaterally ~and~~(Edema 1+/3+ LE b/l).   Abdomen: ~(difficult exam due to morbid obesity) Bowel sounds were normal. The abdomen was soft and nontender. no masses palpated.   Psychiatric normal mood and affect .     Past Cardiology Results (Last 3 Years):    I have personally reviewed his tests' images and my impressions are:  - Echo is normal left atrial ejection fraction of 60% with no abnormal wall abnormalities.  - His nuclear stress test is showing normal tracing but with mild reversible defect along the basal anterior wall which completely corrects on attenuated images. Therefore, this image and is suggestive of artifact, However, some aspects of mild ischemia cannot be definitely excluded.   - Calcium score is 1375.   Cath:  No results found for this or any previous visit from the past 1095 days.    CV NCDR CATHPCI V5 COLLECTION FORM   Stress Test:  Nuclear Stress Test 3/17/2023    Cardiac Imaging:  No results found for this or any previous visit from the past 1095 days.       Assessment/Plan   In summary this is a 63-year-old diabetic morbid obese  male with dyspnea on exertion that is related to his pulmonary function but may be also related to myocardial ischemia.     # CAD  - Calcium score 1375 which implies elevated risk of coronary artery disease.  - EKG showing NSR and signs that may be suggestive of anteroseptal infarct.  - His nuclear stress test is showing normal tracing but with mild reversible defect along the basal anterior wall which completely corrects on attenuated images. Therefore, this image and is suggestive of artifact, However, some aspects of mild ischemia cannot be definitely excluded.  - His left heart catheterization on 04/2023 showed non-obstructive coronary artery disease.  - He feels more active, but his pain in his L hip is impairing him form exercising more. He had a surgical procedure for the R hip and may need surgery for the Left side as well. He still complains of some SOB on exertion, but he believes that this will improve once he gets in shape.   - Keep ASA, statin.  - Follow up in 6 months.     # Sedentary / Morbid obesity  - Shortness of breath on exertion is most probably related to deconditioning.  - Patient endorses putting on a lot of weight for the past 3 years due to the COVID pandemic - went fro 310 to 365lb.  -We had a thorough conversation regarding the necessity for losing weight, exercising and having a healthy diet. The patient acknowledges that he is out of shape and he is going to work on that by doing exercises and trying to lose some weight.  - Will refer patient to a Nutritionist.     # HTN  - Stable BP  -Keep home medication including losartan, hydrochlorothiazide, metoprolol and amlodipine.     # T2DM  -Controlled by his PCP.  - Hb gli 7.4%  -Keep home medication including metformin. This medication should be stopped prior to uneventful left heart catheterization.     # HLP  - LDL 66, HDL 40  - Keep atorvastatin 40mg  - I have spoken to the patient about changing him lifestyle, including trying to lose  some weight.     This note was transcribed using the Dragon Dictation system. There may be grammatical, punctuation, or verbiage errors that occur with voice recognition programs.     Thank you, Dr. Juarez, for allowing me to participate in the care of this patient. Please reach me out if you have any questions or if you need any clarifications regarding the patient's care.      Shaji Sharp MD  Cardiology

## 2023-11-24 DIAGNOSIS — E11.9 TYPE 2 DIABETES MELLITUS WITHOUT COMPLICATION, WITHOUT LONG-TERM CURRENT USE OF INSULIN (MULTI): ICD-10-CM

## 2023-11-27 RX ORDER — METFORMIN HYDROCHLORIDE 500 MG/1
1000 TABLET ORAL 2 TIMES DAILY
Qty: 360 TABLET | Refills: 1 | Status: SHIPPED | OUTPATIENT
Start: 2023-11-27 | End: 2024-01-23 | Stop reason: SDUPTHER

## 2024-01-18 ENCOUNTER — LAB (OUTPATIENT)
Dept: LAB | Facility: LAB | Age: 64
End: 2024-01-18
Payer: COMMERCIAL

## 2024-01-18 DIAGNOSIS — E11.69 DIABETES MELLITUS TYPE 2 IN OBESE: ICD-10-CM

## 2024-01-18 DIAGNOSIS — E66.9 DIABETES MELLITUS TYPE 2 IN OBESE: ICD-10-CM

## 2024-01-18 LAB
ANION GAP SERPL CALC-SCNC: 13 MMOL/L (ref 10–20)
BUN SERPL-MCNC: 24 MG/DL (ref 6–23)
CALCIUM SERPL-MCNC: 10.1 MG/DL (ref 8.6–10.3)
CHLORIDE SERPL-SCNC: 100 MMOL/L (ref 98–107)
CO2 SERPL-SCNC: 29 MMOL/L (ref 21–32)
CREAT SERPL-MCNC: 0.92 MG/DL (ref 0.5–1.3)
EGFRCR SERPLBLD CKD-EPI 2021: >90 ML/MIN/1.73M*2
EST. AVERAGE GLUCOSE BLD GHB EST-MCNC: 131 MG/DL
GLUCOSE SERPL-MCNC: 134 MG/DL (ref 74–99)
HBA1C MFR BLD: 6.2 %
POTASSIUM SERPL-SCNC: 4.3 MMOL/L (ref 3.5–5.3)
SODIUM SERPL-SCNC: 138 MMOL/L (ref 136–145)

## 2024-01-18 PROCEDURE — 80048 BASIC METABOLIC PNL TOTAL CA: CPT

## 2024-01-18 PROCEDURE — 83036 HEMOGLOBIN GLYCOSYLATED A1C: CPT

## 2024-01-18 PROCEDURE — 36415 COLL VENOUS BLD VENIPUNCTURE: CPT

## 2024-01-23 ENCOUNTER — OFFICE VISIT (OUTPATIENT)
Dept: PRIMARY CARE | Facility: CLINIC | Age: 64
End: 2024-01-23
Payer: COMMERCIAL

## 2024-01-23 VITALS
HEIGHT: 70 IN | OXYGEN SATURATION: 99 % | SYSTOLIC BLOOD PRESSURE: 135 MMHG | BODY MASS INDEX: 45.1 KG/M2 | HEART RATE: 84 BPM | WEIGHT: 315 LBS | DIASTOLIC BLOOD PRESSURE: 78 MMHG

## 2024-01-23 DIAGNOSIS — E78.5 HYPERLIPIDEMIA LDL GOAL <70: Primary | ICD-10-CM

## 2024-01-23 DIAGNOSIS — E11.69 DIABETES MELLITUS TYPE 2 IN OBESE: ICD-10-CM

## 2024-01-23 DIAGNOSIS — I15.9 SECONDARY HYPERTENSION: ICD-10-CM

## 2024-01-23 DIAGNOSIS — G47.33 SEVERE OBSTRUCTIVE SLEEP APNEA: ICD-10-CM

## 2024-01-23 DIAGNOSIS — E66.9 DIABETES MELLITUS TYPE 2 IN OBESE: ICD-10-CM

## 2024-01-23 DIAGNOSIS — I10 HTN (HYPERTENSION), BENIGN: ICD-10-CM

## 2024-01-23 DIAGNOSIS — E78.5 HYPERLIPIDEMIA, UNSPECIFIED HYPERLIPIDEMIA TYPE: ICD-10-CM

## 2024-01-23 DIAGNOSIS — E11.9 TYPE 2 DIABETES MELLITUS WITHOUT COMPLICATION, WITHOUT LONG-TERM CURRENT USE OF INSULIN (MULTI): ICD-10-CM

## 2024-01-23 DIAGNOSIS — Z12.5 SCREENING FOR PROSTATE CANCER: ICD-10-CM

## 2024-01-23 PROBLEM — M79.89 LEFT LEG SWELLING: Status: RESOLVED | Noted: 2023-07-11 | Resolved: 2024-01-23

## 2024-01-23 PROBLEM — I70.90 ARTERIOSCLEROTIC VASCULAR DISEASE: Status: RESOLVED | Noted: 2024-01-23 | Resolved: 2024-01-23

## 2024-01-23 PROBLEM — M25.562 ACUTE PAIN OF LEFT KNEE: Status: RESOLVED | Noted: 2023-07-11 | Resolved: 2024-01-23

## 2024-01-23 PROBLEM — I25.10 ATHEROSCLEROSIS OF CORONARY ARTERY: Status: RESOLVED | Noted: 2023-04-06 | Resolved: 2024-01-23

## 2024-01-23 PROBLEM — Z00.00 MEDICARE ANNUAL WELLNESS VISIT, SUBSEQUENT: Status: RESOLVED | Noted: 2023-10-13 | Resolved: 2024-01-23

## 2024-01-23 PROCEDURE — 3008F BODY MASS INDEX DOCD: CPT | Performed by: INTERNAL MEDICINE

## 2024-01-23 PROCEDURE — 4010F ACE/ARB THERAPY RXD/TAKEN: CPT | Performed by: INTERNAL MEDICINE

## 2024-01-23 PROCEDURE — 3044F HG A1C LEVEL LT 7.0%: CPT | Performed by: INTERNAL MEDICINE

## 2024-01-23 PROCEDURE — 3075F SYST BP GE 130 - 139MM HG: CPT | Performed by: INTERNAL MEDICINE

## 2024-01-23 PROCEDURE — 3078F DIAST BP <80 MM HG: CPT | Performed by: INTERNAL MEDICINE

## 2024-01-23 PROCEDURE — 99214 OFFICE O/P EST MOD 30 MIN: CPT | Performed by: INTERNAL MEDICINE

## 2024-01-23 PROCEDURE — 1036F TOBACCO NON-USER: CPT | Performed by: INTERNAL MEDICINE

## 2024-01-23 RX ORDER — ASPIRIN 81 MG/1
81 TABLET ORAL DAILY
COMMUNITY

## 2024-01-23 RX ORDER — HYDROCHLOROTHIAZIDE 25 MG/1
25 TABLET ORAL DAILY
Qty: 90 TABLET | Refills: 1 | Status: SHIPPED | OUTPATIENT
Start: 2024-01-23 | End: 2024-05-10 | Stop reason: SDUPTHER

## 2024-01-23 RX ORDER — AMLODIPINE BESYLATE 10 MG/1
10 TABLET ORAL DAILY
Qty: 90 TABLET | Refills: 1 | Status: SHIPPED | OUTPATIENT
Start: 2024-01-23 | End: 2024-05-10 | Stop reason: SDUPTHER

## 2024-01-23 RX ORDER — METFORMIN HYDROCHLORIDE 500 MG/1
1000 TABLET ORAL 2 TIMES DAILY
Qty: 360 TABLET | Refills: 1 | Status: SHIPPED | OUTPATIENT
Start: 2024-01-23 | End: 2024-05-10 | Stop reason: SDUPTHER

## 2024-01-23 RX ORDER — MULTIVITAMIN
1 TABLET ORAL DAILY
Qty: 90 TABLET | Refills: 1 | Status: SHIPPED | OUTPATIENT
Start: 2024-01-23

## 2024-01-23 RX ORDER — ATORVASTATIN CALCIUM 40 MG/1
40 TABLET, FILM COATED ORAL DAILY
Qty: 90 TABLET | Refills: 1 | Status: SHIPPED | OUTPATIENT
Start: 2024-01-23 | End: 2024-05-10 | Stop reason: SDUPTHER

## 2024-01-23 RX ORDER — GLIPIZIDE 10 MG/1
10 TABLET, FILM COATED, EXTENDED RELEASE ORAL 2 TIMES DAILY
Qty: 180 TABLET | Refills: 1 | Status: SHIPPED | OUTPATIENT
Start: 2024-01-23 | End: 2024-05-10 | Stop reason: SDUPTHER

## 2024-01-23 RX ORDER — LOSARTAN POTASSIUM 50 MG/1
50 TABLET ORAL DAILY
Qty: 90 TABLET | Refills: 1 | Status: SHIPPED | OUTPATIENT
Start: 2024-01-23 | End: 2024-05-10 | Stop reason: SDUPTHER

## 2024-01-23 ASSESSMENT — PATIENT HEALTH QUESTIONNAIRE - PHQ9
2. FEELING DOWN, DEPRESSED OR HOPELESS: NOT AT ALL
SUM OF ALL RESPONSES TO PHQ9 QUESTIONS 1 AND 2: 0
1. LITTLE INTEREST OR PLEASURE IN DOING THINGS: NOT AT ALL

## 2024-01-23 ASSESSMENT — ENCOUNTER SYMPTOMS
ABDOMINAL PAIN: 0
NAUSEA: 0
FATIGUE: 0
COUGH: 0
SHORTNESS OF BREATH: 0
BLOOD IN STOOL: 0
WHEEZING: 0
ARTHRALGIAS: 0
PALPITATIONS: 0
VOMITING: 0
DIARRHEA: 0
BACK PAIN: 0

## 2024-01-23 NOTE — PATIENT INSTRUCTIONS
As we discussed I am very pleased with your checkup today  Please keep watching the sugars closely and if you are running into blood sugars dropping below 100 on any type of consistent basis then please contact me right away as we will want to back down your medication  Please try to work on a gradual healthy weight loss and we will see you back in 3 months for reevaluation.  I have ordered several labs including a fasting cholesterol profile and a PSA for prostate cancer screening

## 2024-01-23 NOTE — ASSESSMENT & PLAN NOTE
-He is doing remarkably well with his diabetes control as evidenced by his hemoglobin A1c of 6.2  -We did talk about watching for hypoglycemia and if he starts developing problems we will back down his medication

## 2024-01-23 NOTE — ASSESSMENT & PLAN NOTE
-His blood pressure does fluctuate but he checks it regularly over time and it is at a satisfactory level at this time  -Asked that he continue to do ambulatory monitoring

## 2024-01-23 NOTE — PROGRESS NOTES
Subjective   Patient ID: Qasim Simon is a 63 y.o. male who presents for Follow-up (3 MO FUV).  HPI  He is here today for his general checkup.  He is looking well and also reports feeling well.  We did conduct a full review of systems.  He also checks his blood pressures at home very regularly and he typically gets systolics in the mid 130s and diastolics in the 70s.  When he arrived he had just walked from the parking lot so after sitting for a few minutes his blood pressure did come down accordingly.  We also went over the results of recent lab work and overall I am very pleased with his numbers.  His hemoglobin A1c went down even further.  He checks his sugars regularly and has had no issues with hypoglycemia.  I did ask that he review the handout we gave him last time and to call if he sees that his sugars are dropping.  The response would be to back down the medication.  We also discussed the importance of losing weight over time and a very stable study fashion.  We also discussed prostate cancer screening and we will be checking a PSA just prior to his next follow-up visit along with a cholesterol profile.  He does want to continue coming in every 3 months because he states it helps with his accountability.  Review of Systems   Constitutional:  Negative for fatigue.   Respiratory:  Negative for cough, shortness of breath and wheezing.         Chronic MIRAMONTES and stable   Cardiovascular:  Negative for chest pain, palpitations and leg swelling.   Gastrointestinal:  Negative for abdominal pain, blood in stool, diarrhea, nausea and vomiting.   Musculoskeletal:  Negative for arthralgias and back pain.     Objective   Physical Exam  Vitals and nursing note reviewed.   Constitutional:       General: He is not in acute distress.     Appearance: Normal appearance.   HENT:      Head: Normocephalic and atraumatic.   Eyes:      Conjunctiva/sclera: Conjunctivae normal.   Cardiovascular:      Rate and Rhythm: Normal rate  and regular rhythm.      Heart sounds: Normal heart sounds.   Pulmonary:      Effort: No respiratory distress.      Breath sounds: No wheezing.   Abdominal:      Palpations: Abdomen is soft.      Tenderness: There is no abdominal tenderness. There is no guarding.   Musculoskeletal:         General: No swelling. Normal range of motion.   Skin:     General: Skin is warm and dry.   Neurological:      General: No focal deficit present.      Mental Status: He is alert and oriented to person, place, and time.   Psychiatric:         Behavior: Behavior normal.       Recent Results (from the past 672 hour(s))   Basic Metabolic Panel    Collection Time: 01/18/24  7:38 AM   Result Value Ref Range    Glucose 134 (H) 74 - 99 mg/dL    Sodium 138 136 - 145 mmol/L    Potassium 4.3 3.5 - 5.3 mmol/L    Chloride 100 98 - 107 mmol/L    Bicarbonate 29 21 - 32 mmol/L    Anion Gap 13 10 - 20 mmol/L    Urea Nitrogen 24 (H) 6 - 23 mg/dL    Creatinine 0.92 0.50 - 1.30 mg/dL    eGFR >90 >60 mL/min/1.73m*2    Calcium 10.1 8.6 - 10.3 mg/dL   Hemoglobin A1C    Collection Time: 01/18/24  7:38 AM   Result Value Ref Range    Hemoglobin A1C 6.2 (H) see below %    Estimated Average Glucose 131 Not Established mg/dL       Assessment/Plan   Problem List Items Addressed This Visit             ICD-10-CM    Severe obstructive sleep apnea G47.33    Hyperlipidemia LDL goal <70 - Primary E78.5    Relevant Orders    Lipid Panel    HTN (hypertension) I10     -His blood pressure does fluctuate but he checks it regularly over time and it is at a satisfactory level at this time  -Asked that he continue to do ambulatory monitoring         Relevant Medications    hydroCHLOROthiazide (HYDRODiuril) 25 mg tablet    losartan (Cozaar) 50 mg tablet    Diabetes mellitus type 2 in obese (CMS/[facility]) E11.69, E66.9     -He is doing remarkably well with his diabetes control as evidenced by his hemoglobin A1c of 6.2  -We did talk about watching for hypoglycemia and if he starts  developing problems we will back down his medication         Relevant Medications    multivitamin tablet    Screening for prostate cancer Z12.5    Relevant Orders    Prostate Spec.Ag,Screen    Body mass index (BMI) 45.0-49.9, adult (CMS/[facility]) Z68.42    Type 2 diabetes mellitus without complication, without long-term current use of insulin (CMS/[facility]) E11.9     -Doing well         Relevant Medications    glipiZIDE XL (Glucotrol XL) 10 mg 24 hr tablet    metFORMIN (Glucophage) 500 mg tablet    Other Relevant Orders    Basic Metabolic Panel    Hemoglobin A1C     Other Visit Diagnoses         Codes    HTN (hypertension), benign     I10    Relevant Medications    amLODIPine (Norvasc) 10 mg tablet    Hyperlipidemia, unspecified hyperlipidemia type     E78.5    Relevant Medications    atorvastatin (Lipitor) 40 mg tablet               Ada Juarez,

## 2024-04-16 DIAGNOSIS — I15.9 SECONDARY HYPERTENSION: ICD-10-CM

## 2024-04-16 RX ORDER — METOPROLOL SUCCINATE 50 MG/1
50 TABLET, EXTENDED RELEASE ORAL DAILY
Qty: 30 TABLET | Refills: 1 | Status: SHIPPED | OUTPATIENT
Start: 2024-04-16 | End: 2024-05-10 | Stop reason: SDUPTHER

## 2024-05-03 ENCOUNTER — LAB (OUTPATIENT)
Dept: LAB | Facility: LAB | Age: 64
End: 2024-05-03
Payer: COMMERCIAL

## 2024-05-03 DIAGNOSIS — E78.5 HYPERLIPIDEMIA LDL GOAL <70: ICD-10-CM

## 2024-05-03 DIAGNOSIS — Z12.5 SCREENING FOR PROSTATE CANCER: ICD-10-CM

## 2024-05-03 DIAGNOSIS — E11.9 TYPE 2 DIABETES MELLITUS WITHOUT COMPLICATION, WITHOUT LONG-TERM CURRENT USE OF INSULIN (MULTI): ICD-10-CM

## 2024-05-03 LAB
ANION GAP SERPL CALC-SCNC: 13 MMOL/L (ref 10–20)
BUN SERPL-MCNC: 27 MG/DL (ref 6–23)
CALCIUM SERPL-MCNC: 10 MG/DL (ref 8.6–10.3)
CHLORIDE SERPL-SCNC: 104 MMOL/L (ref 98–107)
CHOLEST SERPL-MCNC: 132 MG/DL (ref 0–199)
CHOLESTEROL/HDL RATIO: 3.6
CO2 SERPL-SCNC: 26 MMOL/L (ref 21–32)
CREAT SERPL-MCNC: 0.97 MG/DL (ref 0.5–1.3)
EGFRCR SERPLBLD CKD-EPI 2021: 87 ML/MIN/1.73M*2
EST. AVERAGE GLUCOSE BLD GHB EST-MCNC: 148 MG/DL
GLUCOSE SERPL-MCNC: 116 MG/DL (ref 74–99)
HBA1C MFR BLD: 6.8 %
HDLC SERPL-MCNC: 37 MG/DL
LDLC SERPL CALC-MCNC: 71 MG/DL
NON HDL CHOLESTEROL: 95 MG/DL (ref 0–149)
POTASSIUM SERPL-SCNC: 4.1 MMOL/L (ref 3.5–5.3)
PSA SERPL-MCNC: 2.24 NG/ML
SODIUM SERPL-SCNC: 139 MMOL/L (ref 136–145)
TRIGL SERPL-MCNC: 120 MG/DL (ref 0–149)
VLDL: 24 MG/DL (ref 0–40)

## 2024-05-03 PROCEDURE — 80061 LIPID PANEL: CPT

## 2024-05-03 PROCEDURE — 36415 COLL VENOUS BLD VENIPUNCTURE: CPT

## 2024-05-03 PROCEDURE — G0103 PSA SCREENING: HCPCS

## 2024-05-03 PROCEDURE — 83036 HEMOGLOBIN GLYCOSYLATED A1C: CPT

## 2024-05-03 PROCEDURE — 80048 BASIC METABOLIC PNL TOTAL CA: CPT

## 2024-05-07 ENCOUNTER — OFFICE VISIT (OUTPATIENT)
Dept: CARDIOLOGY | Facility: CLINIC | Age: 64
End: 2024-05-07
Payer: COMMERCIAL

## 2024-05-07 VITALS
BODY MASS INDEX: 45.1 KG/M2 | HEART RATE: 91 BPM | WEIGHT: 315 LBS | SYSTOLIC BLOOD PRESSURE: 120 MMHG | DIASTOLIC BLOOD PRESSURE: 64 MMHG | OXYGEN SATURATION: 94 % | HEIGHT: 70 IN

## 2024-05-07 DIAGNOSIS — I50.30 DIASTOLIC HEART FAILURE, UNSPECIFIED HF CHRONICITY (MULTI): Primary | ICD-10-CM

## 2024-05-07 DIAGNOSIS — R06.09 DYSPNEA ON EXERTION: ICD-10-CM

## 2024-05-07 PROCEDURE — 3074F SYST BP LT 130 MM HG: CPT | Performed by: STUDENT IN AN ORGANIZED HEALTH CARE EDUCATION/TRAINING PROGRAM

## 2024-05-07 PROCEDURE — 99214 OFFICE O/P EST MOD 30 MIN: CPT | Performed by: STUDENT IN AN ORGANIZED HEALTH CARE EDUCATION/TRAINING PROGRAM

## 2024-05-07 PROCEDURE — 1036F TOBACCO NON-USER: CPT | Performed by: STUDENT IN AN ORGANIZED HEALTH CARE EDUCATION/TRAINING PROGRAM

## 2024-05-07 PROCEDURE — 3044F HG A1C LEVEL LT 7.0%: CPT | Performed by: STUDENT IN AN ORGANIZED HEALTH CARE EDUCATION/TRAINING PROGRAM

## 2024-05-07 PROCEDURE — 3048F LDL-C <100 MG/DL: CPT | Performed by: STUDENT IN AN ORGANIZED HEALTH CARE EDUCATION/TRAINING PROGRAM

## 2024-05-07 PROCEDURE — 3008F BODY MASS INDEX DOCD: CPT | Performed by: STUDENT IN AN ORGANIZED HEALTH CARE EDUCATION/TRAINING PROGRAM

## 2024-05-07 PROCEDURE — 3078F DIAST BP <80 MM HG: CPT | Performed by: STUDENT IN AN ORGANIZED HEALTH CARE EDUCATION/TRAINING PROGRAM

## 2024-05-07 PROCEDURE — 4010F ACE/ARB THERAPY RXD/TAKEN: CPT | Performed by: STUDENT IN AN ORGANIZED HEALTH CARE EDUCATION/TRAINING PROGRAM

## 2024-05-07 NOTE — PROGRESS NOTES
Chief Complaint   Patient presents with    6 month f/u     HPI:  I  was asked by Dr. Juarez to evaluate this patient in consultation for dyspnea.     Per chart review, Mr. Simon is a 63-year-old non-smoker diabetic obese male with prior medical history significant for hypertension, diabetes, hyperlipidemia, who is being evaluated for dyspnea on exertion. Patient states that he has been feeling shortness of breath on exertion for the last 3 years after he had COVID. His shortness of breath is associated with exertion and used to be worse 3 years ago when he could not walk inside his house without feeling it along with tachycardia. From July 2021 to January 2022, he needed oxygen at home but has improved with afterwards and no longer uses it. He states that he is still feels shortness of breath and tachycardia on exertion, that has improved from the time that he was in need of oxygen but still exists. Patient uses BiPAP by night and reports that he feels much better while on BIPAP. His morbid obesity indeed contributes to this shortness of breath. The patient states that he has been seen pulmonologist that agrees that the shortness of breath has respiratory cause but may be also associated with myocardial ischemia. The patient also reports leg swelling. The patient denies chest pain, syncope, abdominal pain, lightheadedness.  His EKG shows signs that may be suggestive of anteroseptal infarct.  He has a elevated calcium score of 1375.  Patient states that he had an echo performed in 2021 but I cannot find the results here in the chart or in the system.  Patient states that he is mother had a heart attack at the age of late 60s and his father also had cardiovascular disease at the age of 84.  Echo is normal left atrial ejection fraction of 60% with no abnormal wall abnormalities.  His nuclear stress test is showing normal tracing but with mild reversible defect along the basal anterior wall which completely corrects  on attenuated images. Therefore, this image and is suggestive of artifact, However, some aspects of mild ischemia cannot be definitely excluded.  His left heart catheterization on 04/2023 showed non-obstructive coronary artery disease.  Last visit, patient stated that he had lost 30 lb. He felt more active, but his pain in his L hip was impairing him form exercising more. He had a surgical procedure for the R hip and may need surgery for the Left side as well.     Patient returns today feeling fine. He still complains of some SOB on exertion, but he believes that this will improve once he gets in shape. Still struggling to lose weight, has put on 6 lb recently (after losing 30 lb in the past). He has arthritis in his L knee and will not need surgery at this point. He denies chest pain, palpitations, leg edema, lightheadedness, headaches, fever, chills, orthopnea, paroxysmal nocturnal dyspnea or syncope.       Past Medical History  Past Medical History:   Diagnosis Date    Arteriosclerotic vascular disease 01/23/2024    Atherosclerosis of coronary artery 04/06/2023       Past Surgical History  Past Surgical History:   Procedure Laterality Date    CARDIAC CATHETERIZATION      OTHER SURGICAL HISTORY  04/19/2022    Hip surgery    OTHER SURGICAL HISTORY  04/19/2022    Cholecystectomy       Past Family History  No family history on file.    Allergy History  Allergies   Allergen Reactions    Beeswax Swelling     bees       Past Social History  Social History     Socioeconomic History    Marital status:      Spouse name: None    Number of children: None    Years of education: None    Highest education level: None   Occupational History    None   Tobacco Use    Smoking status: Never     Passive exposure: Past    Smokeless tobacco: Never   Vaping Use    Vaping status: Never Used   Substance and Sexual Activity    Alcohol use: Not Currently    Drug use: Not Currently    Sexual activity: None   Other Topics Concern     "None   Social History Narrative    None     Social Determinants of Health     Financial Resource Strain: Not on file   Food Insecurity: Not on file   Transportation Needs: Not on file   Physical Activity: Not on file   Stress: Not on file   Social Connections: Not on file   Intimate Partner Violence: Not on file   Housing Stability: Not on file       Social History     Tobacco Use   Smoking Status Never    Passive exposure: Past   Smokeless Tobacco Never         Objective Data:  Last Recorded Vitals:  Vitals:    05/07/24 0958   BP: 120/64   Pulse: 91   SpO2: 94%   Weight: (!) 156 kg (344 lb 8 oz)   Height: 1.778 m (5' 10\")       Last Labs:  CBC - No results in last year.  _ _ _    _      CMP - 5/3/2024:  7:35 AM  10.0 _ _ --- _   _ _ _ _      PTT - No results in last year.  _   _ _     BNP   Date/Time Value Ref Range Status   03/02/2023 03:19 PM 25 0 - 99 pg/mL Final     Comment:     .  <100 pg/mL - Heart failure unlikely  100-299 pg/mL - Intermediate probability of acute heart  .               failure exacerbation. Correlate with clinical  .               context and patient history.    >=300 pg/mL - Heart Failure likely. Correlate with clinical  .               context and patient history.  BNP testing is performed using different testing   methodology at [facility] than at other   [facility] hospitals. Direct result comparisons should   only be made within the same method.       HGBA1C   Date/Time Value Ref Range Status   05/03/2024 07:35 AM 6.8 see below % Final   01/18/2024 07:38 AM 6.2 see below % Final     LDLCALC   Date/Time Value Ref Range Status   05/03/2024 07:35 AM 71 <=99 mg/dL Final     Comment:                                 Near   Borderline      AGE      Desirable  Optimal    High     High     Very High     0-19 Y     0 - 109     ---    110-129   >/= 130     ----    20-24 Y     0 - 119     ---    120-159   >/= 160     ----      >24 Y     0 -  99   100-129  130-159   160-189     >/=190   "     VLDL   Date/Time Value Ref Range Status   05/03/2024 07:35 AM 24 0 - 40 mg/dL Final   07/06/2023 08:41 AM 38 0 - 40 mg/dL Final   08/01/2022 07:48 AM 22 0 - 40 mg/dL Final   04/20/2022 09:22 AM 36 0 - 40 mg/dL Final        Patient Medications:  Outpatient Encounter Medications as of 5/7/2024   Medication Sig Dispense Refill    amLODIPine (Norvasc) 10 mg tablet Take 1 tablet (10 mg) by mouth once daily. 90 tablet 1    ascorbic acid (VITAMIN C ORAL) Take 1 tablet by mouth once daily.      aspirin 81 mg EC tablet Take 1 tablet (81 mg) by mouth once daily.      atorvastatin (Lipitor) 40 mg tablet Take 1 tablet (40 mg) by mouth once daily. 90 tablet 1    blood sugar diagnostic (Blood Glucose Test) strip USE TO TEST BLOOD SUGAR TWICE DAILY, DX E11.9      fish oil concentrate (Omega-3) 120-180 mg capsule Take 1 capsule (1 g) by mouth once daily.      glipiZIDE XL (Glucotrol XL) 10 mg 24 hr tablet Take 1 tablet (10 mg) by mouth 2 times a day. 180 tablet 1    hydroCHLOROthiazide (HYDRODiuril) 25 mg tablet Take 1 tablet (25 mg) by mouth once daily. 90 tablet 1    loratadine (Claritin Liqui-Gel) 10 mg capsule Take 1 capsule by mouth once daily.      losartan (Cozaar) 50 mg tablet Take 1 tablet (50 mg) by mouth once daily. 90 tablet 1    metFORMIN (Glucophage) 500 mg tablet Take 2 tablets (1,000 mg) by mouth 2 times a day. 360 tablet 1    metoprolol succinate XL (Toprol-XL) 50 mg 24 hr tablet Take 1 tablet by mouth once daily 30 tablet 1    multivitamin tablet Take 1 tablet by mouth once daily. 90 tablet 1     No facility-administered encounter medications on file as of 5/7/2024.       Physical Exam:  General: alert, oriented and in no acute distress  HEENT: NC/AT; EOMI; PERRLA, external ear is normal  Neck: supple; trachea midline; no masses; no JVD  Chest: clear breath sounds bilaterally; no wheezing  Cardio: regular rhythm, S1S2 normal, no murmurs  Abdomen: Soft, non-tender, non-distension, no organomegaly  Extremities:  Edema 1+/3+ LE b/l  Neuro: Grossly intact     Psychiatric: Normal mood and affect     Past Cardiology Results (Last 3 Years):    Stress Test:  Nuclear Stress Test     I have personally reviewed his tests' images and my impressions are:  - Echo is normal left atrial ejection fraction of 60% with no abnormal wall abnormalities.  - His nuclear stress test is showing normal tracing but with mild reversible defect along the basal anterior wall which completely corrects on attenuated images. Therefore, this image and is suggestive of artifact, However, some aspects of mild ischemia cannot be definitely excluded.  - Calcium score is 1375.   - His left heart catheterization on 04/2023 showed non-obstructive coronary artery disease.     Assessment/Plan     In summary this is a 63-year-old diabetic morbid obese male with dyspnea on exertion that is related to his pulmonary function but may be also related to myocardial ischemia.     # CAD  - Calcium score 1375 which implies elevated risk of coronary artery disease.  - EKG showing NSR and signs that may be suggestive of anteroseptal infarct.  - His nuclear stress test is showing normal tracing but with mild reversible defect along the basal anterior wall which completely corrects on attenuated images. Therefore, this image and is suggestive of artifact, However, some aspects of mild ischemia cannot be definitely excluded.  - His left heart catheterization on 04/2023 showed non-obstructive coronary artery disease.  - He feels more active, but his pain in his L knee (arthritis) is impairing him form exercising more. He still complains of some SOB on exertion, but he believes that this is related to obesity and for being out of shape.   - Keep ASA, statin.  - Follow up in 6 months with echocardiogram.     # Sedentary / Morbid obesity  - Shortness of breath on exertion is most probably related to deconditioning.  - Patient endorses putting on a lot of weight for the past 3 years  due to the COVID pandemic - went fro 310 to 365lb.  -We had a thorough conversation regarding the necessity for losing weight, exercising and having a healthy diet. The patient acknowledges that he is out of shape and he is going to work on that by doing exercises and trying to lose some weight.  - Follow up with Nutritionist.     # HTN  - Stable BP  - Keep home medication including losartan 50mg daily, hydrochlorothiazide 25mg daily, metoprolol succinate 50mg daily and amlodipine 10mg daily.     # T2DM  - Controlled by his PCP.  - Hb gli 7.4%  - Keep home medication including metformin, glipizide. This medication should be stopped prior to uneventful left heart catheterization.     # HLP  - LDL 66, HDL 40  - Keep atorvastatin 40mg daily.  - I have spoken to the patient about changing him lifestyle, including trying to lose some weight.    We have discussed the most common side effects of the prescribed medications, indications, drug interactions, risks, complications, and alternatives of medications/therapeutics were explained and discussed. The patient has been requested to monitor closely for any untoward side effects or complications of medications. The patient has been strongly advised to be compliant with the recommendations, all the questions and concerns have been addressed. The patient has been also instructed to call, to return sooner or to go to the emergency department if symptoms persist or get worsen. The patient voiced understanding and denies any further questions at this time.      This note was transcribed using the Dragon Dictation system. There may be grammatical, punctuation, or verbiage errors that occur with voice recognition programs.    Counseling greater than 50% of visit regarding all cardiac issues.    Thank you, Dr. Juarez, for allowing me to participate in the care of this patient. Please do not hesitate to contact me with any further questions or concerns.    Shaji Sharp,  MD  Cardiology

## 2024-05-10 ENCOUNTER — OFFICE VISIT (OUTPATIENT)
Dept: PRIMARY CARE | Facility: CLINIC | Age: 64
End: 2024-05-10
Payer: COMMERCIAL

## 2024-05-10 VITALS
WEIGHT: 315 LBS | HEART RATE: 84 BPM | BODY MASS INDEX: 45.1 KG/M2 | HEIGHT: 70 IN | SYSTOLIC BLOOD PRESSURE: 138 MMHG | OXYGEN SATURATION: 99 % | DIASTOLIC BLOOD PRESSURE: 64 MMHG

## 2024-05-10 DIAGNOSIS — R97.20 RISING PSA LEVEL: Primary | ICD-10-CM

## 2024-05-10 DIAGNOSIS — I15.9 SECONDARY HYPERTENSION: ICD-10-CM

## 2024-05-10 DIAGNOSIS — E78.5 HYPERLIPIDEMIA, UNSPECIFIED HYPERLIPIDEMIA TYPE: ICD-10-CM

## 2024-05-10 DIAGNOSIS — E11.9 TYPE 2 DIABETES MELLITUS WITHOUT COMPLICATION, WITHOUT LONG-TERM CURRENT USE OF INSULIN (MULTI): ICD-10-CM

## 2024-05-10 DIAGNOSIS — I10 HTN (HYPERTENSION), BENIGN: ICD-10-CM

## 2024-05-10 DIAGNOSIS — E78.5 HYPERLIPIDEMIA LDL GOAL <70: ICD-10-CM

## 2024-05-10 PROBLEM — E11.69 DIABETES MELLITUS TYPE 2 IN OBESE: Status: RESOLVED | Noted: 2023-07-11 | Resolved: 2024-05-10

## 2024-05-10 PROBLEM — E66.9 DIABETES MELLITUS TYPE 2 IN OBESE: Status: RESOLVED | Noted: 2023-07-11 | Resolved: 2024-05-10

## 2024-05-10 PROBLEM — Z12.5 SCREENING FOR PROSTATE CANCER: Status: RESOLVED | Noted: 2023-10-13 | Resolved: 2024-05-10

## 2024-05-10 PROCEDURE — 3078F DIAST BP <80 MM HG: CPT | Performed by: INTERNAL MEDICINE

## 2024-05-10 PROCEDURE — 3048F LDL-C <100 MG/DL: CPT | Performed by: INTERNAL MEDICINE

## 2024-05-10 PROCEDURE — 1036F TOBACCO NON-USER: CPT | Performed by: INTERNAL MEDICINE

## 2024-05-10 PROCEDURE — 4010F ACE/ARB THERAPY RXD/TAKEN: CPT | Performed by: INTERNAL MEDICINE

## 2024-05-10 PROCEDURE — 3075F SYST BP GE 130 - 139MM HG: CPT | Performed by: INTERNAL MEDICINE

## 2024-05-10 PROCEDURE — 3008F BODY MASS INDEX DOCD: CPT | Performed by: INTERNAL MEDICINE

## 2024-05-10 PROCEDURE — 99214 OFFICE O/P EST MOD 30 MIN: CPT | Performed by: INTERNAL MEDICINE

## 2024-05-10 PROCEDURE — 3044F HG A1C LEVEL LT 7.0%: CPT | Performed by: INTERNAL MEDICINE

## 2024-05-10 RX ORDER — GLIPIZIDE 10 MG/1
10 TABLET, FILM COATED, EXTENDED RELEASE ORAL 2 TIMES DAILY
Qty: 180 TABLET | Refills: 1 | Status: SHIPPED | OUTPATIENT
Start: 2024-05-10

## 2024-05-10 RX ORDER — METOPROLOL SUCCINATE 50 MG/1
50 TABLET, EXTENDED RELEASE ORAL DAILY
Qty: 90 TABLET | Refills: 1 | Status: SHIPPED | OUTPATIENT
Start: 2024-05-10

## 2024-05-10 RX ORDER — METFORMIN HYDROCHLORIDE 500 MG/1
1000 TABLET ORAL 2 TIMES DAILY
Qty: 360 TABLET | Refills: 1 | Status: SHIPPED | OUTPATIENT
Start: 2024-05-10

## 2024-05-10 RX ORDER — HYDROCHLOROTHIAZIDE 25 MG/1
25 TABLET ORAL DAILY
Qty: 90 TABLET | Refills: 1 | Status: SHIPPED | OUTPATIENT
Start: 2024-05-10

## 2024-05-10 RX ORDER — ATORVASTATIN CALCIUM 40 MG/1
40 TABLET, FILM COATED ORAL DAILY
Qty: 90 TABLET | Refills: 1 | Status: SHIPPED | OUTPATIENT
Start: 2024-05-10

## 2024-05-10 RX ORDER — AMLODIPINE BESYLATE 10 MG/1
10 TABLET ORAL DAILY
Qty: 90 TABLET | Refills: 1 | Status: SHIPPED | OUTPATIENT
Start: 2024-05-10

## 2024-05-10 RX ORDER — LOSARTAN POTASSIUM 50 MG/1
50 TABLET ORAL DAILY
Qty: 90 TABLET | Refills: 1 | Status: SHIPPED | OUTPATIENT
Start: 2024-05-10

## 2024-05-10 ASSESSMENT — ENCOUNTER SYMPTOMS
VOMITING: 0
SHORTNESS OF BREATH: 0
WHEEZING: 0
NAUSEA: 0
PALPITATIONS: 0
COUGH: 0
BLOOD IN STOOL: 0
FATIGUE: 0
ARTHRALGIAS: 0
ABDOMINAL PAIN: 0
DIARRHEA: 0
BACK PAIN: 0

## 2024-05-10 NOTE — ASSESSMENT & PLAN NOTE
-Overall his cholesterol profile is excellent and he will continue taking his atorvastatin-we will check his cholesterol once a year

## 2024-05-10 NOTE — PATIENT INSTRUCTIONS
As we discussed the staff will be helping you to get an appointment with urology just to make sure everything is okay with your prostate  Keep up the good work with your monitoring and your diet etc.  Please call if your sugars are trending upward as we can stay ahead of things before your next appointment in 3 months  Please read the handout I sent to you via [employer] on ways to reduce your risk of falls  We will see you back in 3 months and have ordered just the hemoglobin A1c

## 2024-05-10 NOTE — ASSESSMENT & PLAN NOTE
-I believe he has a little bit of whitecoat syndrome and I am glad that he checks his blood pressures at home periodically.  We will continue to monitor

## 2024-05-10 NOTE — PROGRESS NOTES
Subjective   Patient ID: Qasim Simon is a 63 y.o. male who presents for Follow-up (3 MO FUV).  HPI  He is here today for his 3-month checkup.  We are reminded that he wants to come every 3 months because it helps him stay accountable for his blood sugars and lifestyle habits.  He admits that when he arrived he was feeling a bit nervous because he was worried about his hemoglobin A1c.  As a result his blood pressure was elevated after sitting for a while it did come down.  He does check his blood pressures at home periodically and typically gets systolic readings in the 130s.  His hemoglobin A1c was higher than last time but still quite satisfactory at 6.8.  He states he had some celebrations recently that revolved around food and he states he is back on track.  He knows he can call at any time if he sees a trending of his blood sugar readings.  He also was seen by Dr. Sharp recently and had a good checkup.  He will be having a follow-up echocardiogram prior to his next follow-up visit.  He also states that he had the dog out the other day and the dog pulled forcefully on the leash causing him to almost fall.  He noticed that while standing in the lawn he felt a little bit off kilter and we talked about how the uneven terrain can increase his risk of falls.  We talked about fall prevention and I sent him a handout via [employer] on ways to reduce his risk of falls in the future.  We also conducted a review of systems and we went over the results of recent lab work.  Overall pleased with his numbers including his cholesterol which was excellent.  We talked about his borderline low HDL cholesterol and I have recommended that he exercise to help boost that number.  His PSA also seemingly went up precipitously from 0.77 now up to 2.24.  We talked about the PSA and its limitations.  Because of this finding however I do want him to see urology and we will help facilitate a referral.  He also had a recent fit test  through his insurance company which came back negative and he also had a dilated eye examination.  He states they found a rather rare disorder where his findings are being sent [place] for evaluation.  He will know soon the results and I do remind him that as a diabetic he should be seen at least once a year for a dilated exam.  Review of Systems   Constitutional:  Negative for fatigue.   Respiratory:  Negative for cough, shortness of breath and wheezing.    Cardiovascular:  Negative for chest pain, palpitations and leg swelling.   Gastrointestinal:  Negative for abdominal pain, blood in stool, diarrhea, nausea and vomiting.   Musculoskeletal:  Negative for arthralgias and back pain.     Objective   Physical Exam  Vitals and nursing note reviewed.   Constitutional:       General: He is not in acute distress.     Appearance: Normal appearance.   HENT:      Head: Normocephalic and atraumatic.   Eyes:      Conjunctiva/sclera: Conjunctivae normal.   Cardiovascular:      Rate and Rhythm: Normal rate and regular rhythm.      Heart sounds: Normal heart sounds.   Pulmonary:      Effort: No respiratory distress.      Breath sounds: No wheezing.   Abdominal:      Palpations: Abdomen is soft.      Tenderness: There is no abdominal tenderness. There is no guarding.   Musculoskeletal:         General: No swelling. Normal range of motion.   Skin:     General: Skin is warm and dry.   Neurological:      General: No focal deficit present.      Mental Status: He is alert and oriented to person, place, and time.   Psychiatric:         Behavior: Behavior normal.       Recent Results (from the past 672 hour(s))   Basic Metabolic Panel    Collection Time: 05/03/24  7:35 AM   Result Value Ref Range    Glucose 116 (H) 74 - 99 mg/dL    Sodium 139 136 - 145 mmol/L    Potassium 4.1 3.5 - 5.3 mmol/L    Chloride 104 98 - 107 mmol/L    Bicarbonate 26 21 - 32 mmol/L    Anion Gap 13 10 - 20 mmol/L    Urea Nitrogen 27 (H) 6 - 23 mg/dL     Creatinine 0.97 0.50 - 1.30 mg/dL    eGFR 87 >60 mL/min/1.73m*2    Calcium 10.0 8.6 - 10.3 mg/dL   Hemoglobin A1C    Collection Time: 05/03/24  7:35 AM   Result Value Ref Range    Hemoglobin A1C 6.8 (H) see below %    Estimated Average Glucose 148 Not Established mg/dL   Lipid Panel    Collection Time: 05/03/24  7:35 AM   Result Value Ref Range    Cholesterol 132 0 - 199 mg/dL    HDL-Cholesterol 37.0 mg/dL    Cholesterol/HDL Ratio 3.6     LDL Calculated 71 <=99 mg/dL    VLDL 24 0 - 40 mg/dL    Triglycerides 120 0 - 149 mg/dL    Non HDL Cholesterol 95 0 - 149 mg/dL   Prostate Spec.Ag,Screen    Collection Time: 05/03/24  7:35 AM   Result Value Ref Range    Prostate Specific Antigen,Screen 2.24 <=4.00 ng/mL       Assessment/Plan   Problem List Items Addressed This Visit             ICD-10-CM    Hyperlipidemia E78.5     -Overall his cholesterol profile is excellent and he will continue taking his atorvastatin-we will check his cholesterol once a year           Relevant Medications    atorvastatin (Lipitor) 40 mg tablet    HTN (hypertension), benign I10     -I believe he has a little bit of whitecoat syndrome and I am glad that he checks his blood pressures at home periodically.  We will continue to monitor         Relevant Medications    amLODIPine (Norvasc) 10 mg tablet    hydroCHLOROthiazide (HYDRODiuril) 25 mg tablet    losartan (Cozaar) 50 mg tablet    metoprolol succinate XL (Toprol-XL) 50 mg 24 hr tablet    Type 2 diabetes mellitus without complication, without long-term current use of insulin (Multi) E11.9     -His hemoglobin A1c is quite satisfactory at 6.8 but is up from his previous baseline.  He is committed to getting back to his healthy lifestyle habits to keep his sugars under control and he knows to call if he is trending upward         Relevant Medications    glipiZIDE XL (Glucotrol XL) 10 mg 24 hr tablet    metFORMIN (Glucophage) 500 mg tablet    Other Relevant Orders    Hemoglobin A1C    Rising PSA  level - Primary R97.20     -I am referring him to urology for further evaluation         Relevant Orders    Referral to Urology          [facility], DO

## 2024-05-10 NOTE — ASSESSMENT & PLAN NOTE
-His hemoglobin A1c is quite satisfactory at 6.8 but is up from his previous baseline.  He is committed to getting back to his healthy lifestyle habits to keep his sugars under control and he knows to call if he is trending upward

## 2024-06-21 NOTE — PROGRESS NOTES
Patient presents to the office today to Establish with a Rising PSA... Most recent PSA is 2.24 (05/2024), prior was 0.77 (05/23022)... No Fm Hx Of Prostate CA... LUTs are chronic and mild. Denies frequency and urgency. Denies dysuria and hematuria.. Nocturia x1.. Caffeine does worsen LUTs.. No medications for LUTs..No Hx of Kidney Stones... No Hx of UTI's...Patient is able to get somewhat of an erection but unable to maintain it...Testosterone Level was 318 (01/2023)

## 2024-06-24 ENCOUNTER — APPOINTMENT (OUTPATIENT)
Dept: UROLOGY | Facility: CLINIC | Age: 64
End: 2024-06-24
Payer: COMMERCIAL

## 2024-06-24 VITALS
WEIGHT: 315 LBS | HEIGHT: 70 IN | BODY MASS INDEX: 45.1 KG/M2 | HEART RATE: 83 BPM | SYSTOLIC BLOOD PRESSURE: 173 MMHG | DIASTOLIC BLOOD PRESSURE: 83 MMHG

## 2024-06-24 DIAGNOSIS — N52.9 ERECTILE DYSFUNCTION, UNSPECIFIED ERECTILE DYSFUNCTION TYPE: Primary | ICD-10-CM

## 2024-06-24 DIAGNOSIS — R97.20 RISING PSA LEVEL: ICD-10-CM

## 2024-06-24 DIAGNOSIS — R93.1 ELEVATED CORONARY ARTERY CALCIUM SCORE: ICD-10-CM

## 2024-06-24 PROCEDURE — 3044F HG A1C LEVEL LT 7.0%: CPT | Performed by: STUDENT IN AN ORGANIZED HEALTH CARE EDUCATION/TRAINING PROGRAM

## 2024-06-24 PROCEDURE — 3048F LDL-C <100 MG/DL: CPT | Performed by: STUDENT IN AN ORGANIZED HEALTH CARE EDUCATION/TRAINING PROGRAM

## 2024-06-24 PROCEDURE — 99204 OFFICE O/P NEW MOD 45 MIN: CPT | Performed by: STUDENT IN AN ORGANIZED HEALTH CARE EDUCATION/TRAINING PROGRAM

## 2024-06-24 PROCEDURE — 3079F DIAST BP 80-89 MM HG: CPT | Performed by: STUDENT IN AN ORGANIZED HEALTH CARE EDUCATION/TRAINING PROGRAM

## 2024-06-24 PROCEDURE — 3008F BODY MASS INDEX DOCD: CPT | Performed by: STUDENT IN AN ORGANIZED HEALTH CARE EDUCATION/TRAINING PROGRAM

## 2024-06-24 PROCEDURE — 3077F SYST BP >= 140 MM HG: CPT | Performed by: STUDENT IN AN ORGANIZED HEALTH CARE EDUCATION/TRAINING PROGRAM

## 2024-06-24 PROCEDURE — 4010F ACE/ARB THERAPY RXD/TAKEN: CPT | Performed by: STUDENT IN AN ORGANIZED HEALTH CARE EDUCATION/TRAINING PROGRAM

## 2024-06-24 RX ORDER — SILDENAFIL 50 MG/1
50 TABLET, FILM COATED ORAL DAILY PRN
Qty: 10 TABLET | Refills: 0 | Status: SHIPPED | OUTPATIENT
Start: 2024-06-24 | End: 2024-07-24

## 2024-06-24 ASSESSMENT — ENCOUNTER SYMPTOMS
LOSS OF SENSATION IN FEET: 0
DEPRESSION: 0
OCCASIONAL FEELINGS OF UNSTEADINESS: 0

## 2024-06-24 NOTE — PROGRESS NOTES
Subjective   Patient ID: Qasim Simon is a 63 y.o. male    HPI  63 y.o. male who presents to the office today to Establish with a Rising PSA... Most recent PSA is 2.24 (05/2024), prior was 0.77 (05/23022)... No Fm Hx Of Prostate CA... LUTs are chronic and mild. Denies frequency and urgency. Denies dysuria and hematuria.. Nocturia x1.. Caffeine does worsen LUTs.. No medications for LUTs..No Hx of Kidney Stones... No Hx of UTI's...Patient is able to get somewhat of an erection but unable to maintain it...Testosterone Level was 318 (01/2023)    The most recent PSA, conducted on 5/2024, revealed:  2.24 ng/ml       Review of Systems    All systems were reviewed. Anything negative was noted in the HPI.    Objective   Physical Exam    General: Well developed, well nourished, alert and cooperative, appears in no acute distress   Eyes: Non-injected conjunctiva, sclera clear, no proptosis   Cardiac: Extremities are warm and well perfused. No edema, cyanosis or pallor   Lungs: Breathing is easy, non-labored. Speaking in clear and complete sentences. Normal diaphragmatic movement   MSK: Ambulatory with steady gait, unassisted   Neuro: Alert and oriented to person, place, and time   Psych: Demonstrates good judgment and reason, without hallucinations, abnormal affect or abnormal behaviors   Skin: No obvious lesions, no rashes       No CVA tenderness bilaterally   No suprapubic pain or discomfort       Past Medical History:   Diagnosis Date    Arteriosclerotic vascular disease 01/23/2024    Atherosclerosis of coronary artery 04/06/2023         Past Surgical History:   Procedure Laterality Date    CARDIAC CATHETERIZATION      OTHER SURGICAL HISTORY  04/19/2022    Hip surgery    OTHER SURGICAL HISTORY  04/19/2022    Cholecystectomy           Assessment/Plan   Elevated PSA (2.24 ng/ml), Erectile dysfunction    63 y.o. male who presents for the above condition,  We had a very long and extensive discussion with the patient  regarding elevated PSA. I explained to him the pathophysiology, differential diagnosis, risk factor, associated conditions, and management. I explained to him that elevated PSA could be either due to BPH, prostate infection or inflammation or prostate adenocarcinoma. We discussed the need to do a work-up to rule out any underlying prostate adenocarcinoma in the form of MR fusion biopsy if his MRI is positive or regular TRUS biopsy of the MRI is negative or we cannot do an MRI of the prostate. We discussed at length the risk, benefit, potential complication, adverse events of prostate biopsy including pain, discomfort, urinary retention, hematuria, pneumaturia, hematospermia. Explained to him that there is a 2% to 5% risk of complicated urinary infection and urosepsis. Explained to him indicates it happens, he will need to be admitted for IV antibiotic for 2 to 3 days at the hospital.     We had a very long and extensive discussion with the patient regarding the pathophysiology, differential diagnosis, risk factor, and management of ED. We discussed at length mechanism of action, risk, benefit, potential complication, adverse events of PDE 5 inhibitors in the form of sildenafil 50mg as needed. Instructed the patient to stop the medication in case of any side effects.       Plan:  - Follow up in 6 months with PSA  - Sildenafil 50 mg PRN.        6/24/2024    Scribe Attestation  By signing my name below, I, Esperanza Wade   attest that this documentation has been prepared under the direction and in the presence of Dr. Braulio Betancur

## 2024-09-05 ENCOUNTER — LAB (OUTPATIENT)
Facility: LAB | Age: 64
End: 2024-09-05
Payer: COMMERCIAL

## 2024-09-05 DIAGNOSIS — E11.9 TYPE 2 DIABETES MELLITUS WITHOUT COMPLICATION, WITHOUT LONG-TERM CURRENT USE OF INSULIN (MULTI): ICD-10-CM

## 2024-09-05 LAB
EST. AVERAGE GLUCOSE BLD GHB EST-MCNC: 154 MG/DL
HBA1C MFR BLD: 7 %

## 2024-09-05 PROCEDURE — 36415 COLL VENOUS BLD VENIPUNCTURE: CPT

## 2024-09-05 PROCEDURE — 83036 HEMOGLOBIN GLYCOSYLATED A1C: CPT

## 2024-09-13 ENCOUNTER — APPOINTMENT (OUTPATIENT)
Dept: PRIMARY CARE | Facility: CLINIC | Age: 64
End: 2024-09-13
Payer: COMMERCIAL

## 2024-09-13 VITALS
SYSTOLIC BLOOD PRESSURE: 126 MMHG | OXYGEN SATURATION: 98 % | DIASTOLIC BLOOD PRESSURE: 76 MMHG | HEART RATE: 81 BPM | BODY MASS INDEX: 45.1 KG/M2 | WEIGHT: 315 LBS | HEIGHT: 70 IN

## 2024-09-13 DIAGNOSIS — E11.9 TYPE 2 DIABETES MELLITUS WITHOUT COMPLICATION, WITHOUT LONG-TERM CURRENT USE OF INSULIN (MULTI): Primary | ICD-10-CM

## 2024-09-13 PROCEDURE — 3051F HG A1C>EQUAL 7.0%<8.0%: CPT | Performed by: INTERNAL MEDICINE

## 2024-09-13 PROCEDURE — 3074F SYST BP LT 130 MM HG: CPT | Performed by: INTERNAL MEDICINE

## 2024-09-13 PROCEDURE — 4010F ACE/ARB THERAPY RXD/TAKEN: CPT | Performed by: INTERNAL MEDICINE

## 2024-09-13 PROCEDURE — G0008 ADMIN INFLUENZA VIRUS VAC: HCPCS | Performed by: INTERNAL MEDICINE

## 2024-09-13 PROCEDURE — 99213 OFFICE O/P EST LOW 20 MIN: CPT | Performed by: INTERNAL MEDICINE

## 2024-09-13 PROCEDURE — 3008F BODY MASS INDEX DOCD: CPT | Performed by: INTERNAL MEDICINE

## 2024-09-13 PROCEDURE — 1036F TOBACCO NON-USER: CPT | Performed by: INTERNAL MEDICINE

## 2024-09-13 PROCEDURE — 3078F DIAST BP <80 MM HG: CPT | Performed by: INTERNAL MEDICINE

## 2024-09-13 PROCEDURE — 90673 RIV3 VACCINE NO PRESERV IM: CPT | Performed by: INTERNAL MEDICINE

## 2024-09-13 PROCEDURE — 3048F LDL-C <100 MG/DL: CPT | Performed by: INTERNAL MEDICINE

## 2024-09-13 ASSESSMENT — ENCOUNTER SYMPTOMS
NAUSEA: 0
FATIGUE: 0
CHEST TIGHTNESS: 0
PALPITATIONS: 0
WHEEZING: 0
VOMITING: 0
BLOOD IN STOOL: 0
ABDOMINAL PAIN: 0
ARTHRALGIAS: 0
COUGH: 0
SHORTNESS OF BREATH: 0
DIARRHEA: 0
BACK PAIN: 0

## 2024-09-13 ASSESSMENT — PATIENT HEALTH QUESTIONNAIRE - PHQ9
1. LITTLE INTEREST OR PLEASURE IN DOING THINGS: NOT AT ALL
2. FEELING DOWN, DEPRESSED OR HOPELESS: NOT AT ALL
SUM OF ALL RESPONSES TO PHQ9 QUESTIONS 1 AND 2: 0

## 2024-09-13 NOTE — ASSESSMENT & PLAN NOTE
-Hemoglobin A1c  went up to 7 which is still good  -I am keeping his medications the same but we can make adjustments if his sugars start trending upward more

## 2024-09-13 NOTE — ASSESSMENT & PLAN NOTE
-We discussed the approved diabetic medications for his diabetes and weight loss such as Wegovy or Mounjaro  -We discussed weight watchers  -We discussed the weight loss clinic etc.  -States he will think about it

## 2024-09-13 NOTE — PROGRESS NOTES
Subjective   Patient ID: Qasim Simon is a 63 y.o. male who presents for Follow-up (3 MO FUV).  HPI  He is here today for his 3-month follow up.  He brought in a summary of his blood glucose readings.  He states that overall he has been feeling okay but just has had a lot of stress with family matters in the recent past.  His hemoglobin A1c did creep up a little bit at 7.  We talked about some strategies to watch his diet more closely.  We talked about weight watchers.  We talked about the new GLP 1 agonists or semaglutide's.  We talked about some of the problems of these medications and he understands there are sometimes months with these as well.  We talked about the weight loss center through [facility].  We will keep his medical regimen the same and he does like to return every 3 months to help reduce accountability on his glucose management.  I am ordering a hemoglobin A1c only for his next visit in 3 months.  Review of Systems   Constitutional:  Negative for fatigue.   Respiratory:  Negative for cough, chest tightness, shortness of breath and wheezing.    Cardiovascular:  Negative for chest pain, palpitations and leg swelling.   Gastrointestinal:  Negative for abdominal pain, blood in stool, diarrhea, nausea and vomiting.   Musculoskeletal:  Negative for arthralgias and back pain.     Objective   Physical Exam  Vitals and nursing note reviewed.   Constitutional:       General: He is not in acute distress.     Appearance: Normal appearance.   HENT:      Head: Normocephalic and atraumatic.   Eyes:      Conjunctiva/sclera: Conjunctivae normal.   Cardiovascular:      Rate and Rhythm: Normal rate and regular rhythm.      Heart sounds: Normal heart sounds.   Pulmonary:      Effort: No respiratory distress.      Breath sounds: No wheezing.   Abdominal:      Palpations: Abdomen is soft.      Tenderness: There is no abdominal tenderness. There is no guarding.   Musculoskeletal:         General: No  swelling. Normal range of motion.   Skin:     General: Skin is warm and dry.   Neurological:      General: No focal deficit present.      Mental Status: He is alert and oriented to person, place, and time.   Psychiatric:         Behavior: Behavior normal.       Recent Results (from the past 672 hour(s))   Hemoglobin A1C    Collection Time: 09/05/24  7:09 AM   Result Value Ref Range    Hemoglobin A1C 7.0 (H) see below %    Estimated Average Glucose 154 Not Established mg/dL       Assessment/Plan   Problem List Items Addressed This Visit             ICD-10-CM    Body mass index (BMI) 45.0-49.9, adult (Multi) Z68.42     -We discussed the approved diabetic medications for his diabetes and weight loss such as Wegovy or Mounjaro  -We discussed weight watchers  -We discussed the weight loss clinic etc.  -States he will think about it         Type 2 diabetes mellitus without complication, without long-term current use of insulin (Multi) - Primary E11.9     -Hemoglobin A1c  went up to 7 which is still good  -I am keeping his medications the same but we can make adjustments if his sugars start trending upward more         Relevant Orders    Hemoglobin A1C          Ada Juarez, DO

## 2024-09-13 NOTE — PATIENT INSTRUCTIONS
As we did charley we are making no changes in your medical regimen   please continue to give me an update on your sugars  -If you change your mind about taking one of the newer approved diabetic medications please call and otherwise we will see her back in 3 months

## 2024-10-04 DIAGNOSIS — E78.5 HYPERLIPIDEMIA, UNSPECIFIED HYPERLIPIDEMIA TYPE: ICD-10-CM

## 2024-10-04 RX ORDER — ATORVASTATIN CALCIUM 40 MG/1
40 TABLET, FILM COATED ORAL DAILY
Qty: 90 TABLET | Refills: 1 | Status: SHIPPED | OUTPATIENT
Start: 2024-10-04

## 2024-10-30 ENCOUNTER — HOSPITAL ENCOUNTER (OUTPATIENT)
Dept: CARDIOLOGY | Facility: HOSPITAL | Age: 64
Discharge: HOME | End: 2024-10-30
Payer: COMMERCIAL

## 2024-10-30 VITALS
DIASTOLIC BLOOD PRESSURE: 99 MMHG | OXYGEN SATURATION: 96 % | SYSTOLIC BLOOD PRESSURE: 178 MMHG | RESPIRATION RATE: 20 BRPM | HEART RATE: 94 BPM

## 2024-10-30 DIAGNOSIS — I50.30 DIASTOLIC HEART FAILURE, UNSPECIFIED HF CHRONICITY: ICD-10-CM

## 2024-10-30 DIAGNOSIS — R06.02 SHORTNESS OF BREATH: ICD-10-CM

## 2024-10-30 DIAGNOSIS — R06.09 DYSPNEA ON EXERTION: ICD-10-CM

## 2024-10-30 LAB
AORTIC VALVE MEAN GRADIENT: 3 MMHG
AORTIC VALVE PEAK VELOCITY: 1.21 M/S
AV PEAK GRADIENT: 5.9 MMHG
AVA (PEAK VEL): 4.56 CM2
AVA (VTI): 4.79 CM2
EJECTION FRACTION APICAL 4 CHAMBER: 46.7
EJECTION FRACTION: 60 %
LEFT ATRIUM VOLUME AREA LENGTH INDEX BSA: 30.4 ML/M2
LEFT VENTRICLE INTERNAL DIMENSION DIASTOLE: 4.06 CM (ref 3.5–6)
LEFT VENTRICULAR OUTFLOW TRACT DIAMETER: 2.6 CM
LV EJECTION FRACTION BIPLANE: 60 %
MITRAL VALVE E/A RATIO: 0.77

## 2024-10-30 PROCEDURE — 2500000004 HC RX 250 GENERAL PHARMACY W/ HCPCS (ALT 636 FOR OP/ED): Performed by: STUDENT IN AN ORGANIZED HEALTH CARE EDUCATION/TRAINING PROGRAM

## 2024-10-30 PROCEDURE — 93306 TTE W/DOPPLER COMPLETE: CPT | Performed by: STUDENT IN AN ORGANIZED HEALTH CARE EDUCATION/TRAINING PROGRAM

## 2024-10-30 PROCEDURE — 93306 TTE W/DOPPLER COMPLETE: CPT

## 2024-11-05 ENCOUNTER — APPOINTMENT (OUTPATIENT)
Dept: CARDIOLOGY | Facility: CLINIC | Age: 64
End: 2024-11-05
Payer: COMMERCIAL

## 2024-11-05 VITALS
HEART RATE: 86 BPM | WEIGHT: 315 LBS | DIASTOLIC BLOOD PRESSURE: 78 MMHG | BODY MASS INDEX: 45.1 KG/M2 | OXYGEN SATURATION: 97 % | SYSTOLIC BLOOD PRESSURE: 132 MMHG | HEIGHT: 70 IN

## 2024-11-05 DIAGNOSIS — R93.1 ELEVATED CORONARY ARTERY CALCIUM SCORE: ICD-10-CM

## 2024-11-05 PROCEDURE — 3048F LDL-C <100 MG/DL: CPT | Performed by: STUDENT IN AN ORGANIZED HEALTH CARE EDUCATION/TRAINING PROGRAM

## 2024-11-05 PROCEDURE — 99214 OFFICE O/P EST MOD 30 MIN: CPT | Performed by: STUDENT IN AN ORGANIZED HEALTH CARE EDUCATION/TRAINING PROGRAM

## 2024-11-05 PROCEDURE — 4010F ACE/ARB THERAPY RXD/TAKEN: CPT | Performed by: STUDENT IN AN ORGANIZED HEALTH CARE EDUCATION/TRAINING PROGRAM

## 2024-11-05 PROCEDURE — 1160F RVW MEDS BY RX/DR IN RCRD: CPT | Performed by: STUDENT IN AN ORGANIZED HEALTH CARE EDUCATION/TRAINING PROGRAM

## 2024-11-05 PROCEDURE — 3075F SYST BP GE 130 - 139MM HG: CPT | Performed by: STUDENT IN AN ORGANIZED HEALTH CARE EDUCATION/TRAINING PROGRAM

## 2024-11-05 PROCEDURE — 3078F DIAST BP <80 MM HG: CPT | Performed by: STUDENT IN AN ORGANIZED HEALTH CARE EDUCATION/TRAINING PROGRAM

## 2024-11-05 PROCEDURE — 3008F BODY MASS INDEX DOCD: CPT | Performed by: STUDENT IN AN ORGANIZED HEALTH CARE EDUCATION/TRAINING PROGRAM

## 2024-11-05 PROCEDURE — 1159F MED LIST DOCD IN RCRD: CPT | Performed by: STUDENT IN AN ORGANIZED HEALTH CARE EDUCATION/TRAINING PROGRAM

## 2024-11-05 PROCEDURE — 1036F TOBACCO NON-USER: CPT | Performed by: STUDENT IN AN ORGANIZED HEALTH CARE EDUCATION/TRAINING PROGRAM

## 2024-11-05 PROCEDURE — 3051F HG A1C>EQUAL 7.0%<8.0%: CPT | Performed by: STUDENT IN AN ORGANIZED HEALTH CARE EDUCATION/TRAINING PROGRAM

## 2024-12-14 DIAGNOSIS — I15.9 SECONDARY HYPERTENSION: ICD-10-CM

## 2024-12-23 DIAGNOSIS — I15.9 SECONDARY HYPERTENSION: ICD-10-CM

## 2024-12-23 RX ORDER — METOPROLOL SUCCINATE 50 MG/1
50 TABLET, EXTENDED RELEASE ORAL DAILY
Qty: 90 TABLET | Refills: 1 | Status: SHIPPED | OUTPATIENT
Start: 2024-12-23

## 2024-12-23 RX ORDER — METOPROLOL SUCCINATE 50 MG/1
50 TABLET, EXTENDED RELEASE ORAL DAILY
Qty: 90 TABLET | Refills: 0 | OUTPATIENT
Start: 2024-12-23

## 2025-01-07 ENCOUNTER — LAB (OUTPATIENT)
Facility: LAB | Age: 65
End: 2025-01-07
Payer: COMMERCIAL

## 2025-01-07 DIAGNOSIS — E11.9 TYPE 2 DIABETES MELLITUS WITHOUT COMPLICATION, WITHOUT LONG-TERM CURRENT USE OF INSULIN (MULTI): ICD-10-CM

## 2025-01-07 DIAGNOSIS — R97.20 RISING PSA LEVEL: ICD-10-CM

## 2025-01-07 LAB
EST. AVERAGE GLUCOSE BLD GHB EST-MCNC: 154 MG/DL
HBA1C MFR BLD: 7 %
PSA SERPL-MCNC: 1.36 NG/ML

## 2025-01-07 PROCEDURE — 84153 ASSAY OF PSA TOTAL: CPT

## 2025-01-07 PROCEDURE — 83036 HEMOGLOBIN GLYCOSYLATED A1C: CPT

## 2025-01-13 ENCOUNTER — APPOINTMENT (OUTPATIENT)
Dept: UROLOGY | Facility: CLINIC | Age: 65
End: 2025-01-13
Payer: COMMERCIAL

## 2025-01-16 ENCOUNTER — LAB (OUTPATIENT)
Facility: LAB | Age: 65
End: 2025-01-16
Payer: COMMERCIAL

## 2025-01-16 ENCOUNTER — APPOINTMENT (OUTPATIENT)
Age: 65
End: 2025-01-16
Payer: COMMERCIAL

## 2025-01-16 VITALS
BODY MASS INDEX: 45.1 KG/M2 | DIASTOLIC BLOOD PRESSURE: 88 MMHG | WEIGHT: 315 LBS | OXYGEN SATURATION: 96 % | HEART RATE: 84 BPM | HEIGHT: 70 IN | SYSTOLIC BLOOD PRESSURE: 138 MMHG

## 2025-01-16 DIAGNOSIS — R97.20 RISING PSA LEVEL: ICD-10-CM

## 2025-01-16 DIAGNOSIS — I15.9 SECONDARY HYPERTENSION: ICD-10-CM

## 2025-01-16 DIAGNOSIS — L60.8 TOENAIL DEFORMITY: ICD-10-CM

## 2025-01-16 DIAGNOSIS — I50.30 DIASTOLIC HEART FAILURE, UNSPECIFIED HF CHRONICITY: ICD-10-CM

## 2025-01-16 DIAGNOSIS — E11.9 TYPE 2 DIABETES MELLITUS WITHOUT COMPLICATION, WITHOUT LONG-TERM CURRENT USE OF INSULIN (MULTI): ICD-10-CM

## 2025-01-16 DIAGNOSIS — I10 HTN (HYPERTENSION), BENIGN: ICD-10-CM

## 2025-01-16 DIAGNOSIS — Z00.00 MEDICARE ANNUAL WELLNESS VISIT, SUBSEQUENT: Primary | ICD-10-CM

## 2025-01-16 DIAGNOSIS — E78.5 HYPERLIPIDEMIA, UNSPECIFIED HYPERLIPIDEMIA TYPE: ICD-10-CM

## 2025-01-16 PROBLEM — E66.01 OBESITY, MORBID (MULTI): Status: RESOLVED | Noted: 2023-07-11 | Resolved: 2025-01-16

## 2025-01-16 LAB
CREAT UR-MCNC: 90.6 MG/DL (ref 20–370)
MICROALBUMIN UR-MCNC: 13.5 MG/L
MICROALBUMIN/CREAT UR: 14.9 UG/MG CREAT

## 2025-01-16 PROCEDURE — G0439 PPPS, SUBSEQ VISIT: HCPCS | Performed by: INTERNAL MEDICINE

## 2025-01-16 PROCEDURE — 90677 PCV20 VACCINE IM: CPT | Performed by: INTERNAL MEDICINE

## 2025-01-16 PROCEDURE — 1160F RVW MEDS BY RX/DR IN RCRD: CPT | Performed by: INTERNAL MEDICINE

## 2025-01-16 PROCEDURE — 1159F MED LIST DOCD IN RCRD: CPT | Performed by: INTERNAL MEDICINE

## 2025-01-16 PROCEDURE — 3075F SYST BP GE 130 - 139MM HG: CPT | Performed by: INTERNAL MEDICINE

## 2025-01-16 PROCEDURE — 1124F ACP DISCUSS-NO DSCNMKR DOCD: CPT | Performed by: INTERNAL MEDICINE

## 2025-01-16 PROCEDURE — 3051F HG A1C>EQUAL 7.0%<8.0%: CPT | Performed by: INTERNAL MEDICINE

## 2025-01-16 PROCEDURE — 3079F DIAST BP 80-89 MM HG: CPT | Performed by: INTERNAL MEDICINE

## 2025-01-16 PROCEDURE — G0009 ADMIN PNEUMOCOCCAL VACCINE: HCPCS | Performed by: INTERNAL MEDICINE

## 2025-01-16 PROCEDURE — 1170F FXNL STATUS ASSESSED: CPT | Performed by: INTERNAL MEDICINE

## 2025-01-16 PROCEDURE — 4010F ACE/ARB THERAPY RXD/TAKEN: CPT | Performed by: INTERNAL MEDICINE

## 2025-01-16 PROCEDURE — 82043 UR ALBUMIN QUANTITATIVE: CPT

## 2025-01-16 PROCEDURE — 1036F TOBACCO NON-USER: CPT | Performed by: INTERNAL MEDICINE

## 2025-01-16 PROCEDURE — 3008F BODY MASS INDEX DOCD: CPT | Performed by: INTERNAL MEDICINE

## 2025-01-16 PROCEDURE — 82570 ASSAY OF URINE CREATININE: CPT

## 2025-01-16 PROCEDURE — 99214 OFFICE O/P EST MOD 30 MIN: CPT | Performed by: INTERNAL MEDICINE

## 2025-01-16 RX ORDER — ATORVASTATIN CALCIUM 40 MG/1
40 TABLET, FILM COATED ORAL DAILY
Qty: 100 TABLET | Refills: 1 | Status: SHIPPED | OUTPATIENT
Start: 2025-01-16

## 2025-01-16 RX ORDER — AMLODIPINE BESYLATE 10 MG/1
10 TABLET ORAL DAILY
Qty: 100 TABLET | Refills: 1 | Status: SHIPPED | OUTPATIENT
Start: 2025-01-16

## 2025-01-16 RX ORDER — METFORMIN HYDROCHLORIDE 500 MG/1
1000 TABLET ORAL 2 TIMES DAILY
Qty: 400 TABLET | Refills: 1 | Status: SHIPPED | OUTPATIENT
Start: 2025-01-16

## 2025-01-16 RX ORDER — HYDROCHLOROTHIAZIDE 25 MG/1
25 TABLET ORAL DAILY
Qty: 100 TABLET | Refills: 1 | Status: SHIPPED | OUTPATIENT
Start: 2025-01-16

## 2025-01-16 RX ORDER — LOSARTAN POTASSIUM 50 MG/1
50 TABLET ORAL DAILY
Qty: 100 TABLET | Refills: 1 | Status: SHIPPED | OUTPATIENT
Start: 2025-01-16

## 2025-01-16 RX ORDER — METOPROLOL SUCCINATE 50 MG/1
50 TABLET, EXTENDED RELEASE ORAL DAILY
Qty: 100 TABLET | Refills: 1 | Status: SHIPPED | OUTPATIENT
Start: 2025-01-16

## 2025-01-16 RX ORDER — GLIPIZIDE 10 MG/1
10 TABLET, FILM COATED, EXTENDED RELEASE ORAL 2 TIMES DAILY
Qty: 200 TABLET | Refills: 1 | Status: SHIPPED | OUTPATIENT
Start: 2025-01-16

## 2025-01-16 ASSESSMENT — ENCOUNTER SYMPTOMS
COUGH: 0
FATIGUE: 0
PALPITATIONS: 0
VOMITING: 0
WHEEZING: 0
BACK PAIN: 0
BLOOD IN STOOL: 0
SHORTNESS OF BREATH: 0
ARTHRALGIAS: 0
NAUSEA: 0
DIARRHEA: 0
ABDOMINAL PAIN: 0

## 2025-01-16 ASSESSMENT — ACTIVITIES OF DAILY LIVING (ADL)
DRESSING: INDEPENDENT
TAKING_MEDICATION: INDEPENDENT
DOING_HOUSEWORK: INDEPENDENT
BATHING: INDEPENDENT
MANAGING_FINANCES: INDEPENDENT
GROCERY_SHOPPING: INDEPENDENT

## 2025-01-16 NOTE — PATIENT INSTRUCTIONS
Patient instructions  As we discussed I have placed a referral for you to see podiatry and please let us know if you do not hear from us within 5 business days to get scheduled  Please keep up the fantastic work with your diabetes control and please remember to try to do some form of exercise daily.  I think that chair exercises would work well for you and the goal is to try to build up to 30 minutes 5 days a week  Please also remember that you should complete your power of  for healthcare and living will.  Once these are completed they need to be notarized and we would like a copy for your chart here  Please remember to put warm clothing on your knee when you go outside  We will have you stop on the way out today submit a urine specimen to check for protein  We will see you back in 6 months for another checkup and please remember to get fasting lab work

## 2025-01-16 NOTE — ASSESSMENT & PLAN NOTE
-We remind him to eat a healthy diet, exercise regularly, and try to get his close to ideal body weight as possible

## 2025-01-16 NOTE — ASSESSMENT & PLAN NOTE
-Hemoglobin A1c is excellent at 7.0 and we will continue with his current diabetic regimen  -He will get a urine microalbumin today before leaving and we will contact him with results    Orders:    Albumin-Creatinine Ratio, Urine Random; Future    Basic Metabolic Panel; Future    Hemoglobin A1C; Future    glipiZIDE XL (Glucotrol XL) 10 mg 24 hr tablet; Take 1 tablet (10 mg) by mouth 2 times a day.    metFORMIN (Glucophage) 500 mg tablet; Take 2 tablets (1,000 mg) by mouth 2 times a day.    Referral to Podiatry; Future

## 2025-01-16 NOTE — ASSESSMENT & PLAN NOTE
-His PSA came back low at this time so we will go back to checking once a year       Patient instructions  As we discussed I have placed a referral for you to see podiatry and please let us know if you do not hear from us within 5 business days to get scheduled  Please keep up the fantastic work with your diabetes control and please remember to try to do some form of exercise daily.  I think that chair exercises would work well for you and the goal is to try to build up to 30 minutes 5 days a week  Please also remember that you should complete your power of  for healthcare and living will.  Once these are completed they need to be notarized and we would like a copy for your chart here  Please remember to put warm clothing on your knee when you go outside  We will have you stop on the way out today submit a urine specimen to check for protein  We will see you back in 6 months for another checkup and please remember to get fasting lab work

## 2025-01-16 NOTE — PROGRESS NOTES
Subjective   Reason for Visit: Qasim Simon is an 64 y.o. male here for a Medicare Wellness visit.     Past Medical, Surgical, and Family History reviewed and updated in chart.    Reviewed all medications by prescribing practitioner or clinical pharmacist (such as prescriptions, OTCs, herbal therapies and supplements) and documented in the medical record.    HPI  He is here today for his routine checkup and we also took this opportunity to complete his annual Medicare wellness visit.  He denies any symptoms of depression.  He has had no falls in the last year.  We discussed fall prevention and the importance of putting grab bars in the bathroom.  I will send him a handout via [employer] on tips to reduce his risk of falls in the future.  His memory is good as he is highly independent doing everything for himself.  His hearing also appears to be good today and that he is hearing me fine.  He tries to eat a well-balanced diet.  We discussed the importance of exercise and I suggested he do chair exercises.  We also discussed the importance of completing advanced directives.  He has had problems with his left knee but primarily when he is exposed to cold.  We talked about getting some thermal clothing to wear when he goes outside.  He also has had problems with his toenails and his wife has been trimming them but recently she has had difficulties getting them trimmed adequately.  He has toenail changes and he is also experiencing slight numbness.  I am going to refer him to podiatry for complete evaluation of his feet since he is diabetic.  We also will have him get a urine microalbumin drawn today before leaving and we will contact him with results.  We are providing refills on all medications.  His hemoglobin A1c is fantastic at 7.0.  His PSA went down from last time and we will check this once a year.  We will see him back in 6 months and sooner if any problems.  There we get that handout sent to you and then I it  "is kind of nauseous but I have to dictate twice so he Diomedes  Patient Care Team:  Ada Juarez DO as PCP - General (Internal Medicine)  Ada Juarez DO as PCP - [facility] [employer] PCP     Review of Systems   Constitutional:  Negative for fatigue.   Respiratory:  Negative for cough, shortness of breath and wheezing.    Cardiovascular:  Negative for chest pain, palpitations and leg swelling.   Gastrointestinal:  Negative for abdominal pain, blood in stool, diarrhea, nausea and vomiting.   Musculoskeletal:  Negative for arthralgias and back pain.       Objective   Vitals:  /88   Pulse 84   Ht 1.778 m (5' 10\")   Wt (!) 158 kg (348 lb 14.4 oz)   SpO2 96%   BMI 50.06 kg/m²       Physical Exam  Vitals and nursing note reviewed.   Constitutional:       General: He is not in acute distress.     Appearance: Normal appearance.   HENT:      Head: Normocephalic and atraumatic.   Eyes:      Conjunctiva/sclera: Conjunctivae normal.   Cardiovascular:      Rate and Rhythm: Normal rate and regular rhythm.      Heart sounds: Normal heart sounds.   Pulmonary:      Effort: No respiratory distress.      Breath sounds: No wheezing.   Abdominal:      Palpations: Abdomen is soft.      Tenderness: There is no abdominal tenderness. There is no guarding.   Musculoskeletal:         General: No swelling. Normal range of motion.   Skin:     General: Skin is warm and dry.   Neurological:      General: No focal deficit present.      Mental Status: He is alert and oriented to person, place, and time.   Psychiatric:         Behavior: Behavior normal.       Recent Results (from the past 4 weeks)   PSA    Collection Time: 01/07/25  7:42 AM   Result Value Ref Range    Prostate Specific AG 1.36 <=4.00 ng/mL   Hemoglobin A1C    Collection Time: 01/07/25  7:42 AM   Result Value Ref Range    Hemoglobin A1C 7.0 (H) See comment %    Estimated Average Glucose 154 Not Established mg/dL       Assessment & Plan  Medicare annual " wellness visit, subsequent  -We discussed fall prevention and I sent a handout to him via [employer] on ways to reduce his risk of falls  -We discussed the importance of routine exercise and I have suggested chair exercises  -We discussed completing his advance directives and providing a copy here         HTN (hypertension), benign  -Blood pressure is under adequate control and we will continue with his current antihypertensive regimen    Orders:    amLODIPine (Norvasc) 10 mg tablet; Take 1 tablet (10 mg) by mouth once daily.    Hyperlipidemia, unspecified hyperlipidemia type  -We will check cholesterol prior to his next visit    Orders:    Lipid Panel; Future    atorvastatin (Lipitor) 40 mg tablet; Take 1 tablet (40 mg) by mouth once daily.    Type 2 diabetes mellitus without complication, without long-term current use of insulin (Multi)  -Hemoglobin A1c is excellent at 7.0 and we will continue with his current diabetic regimen  -He will get a urine microalbumin today before leaving and we will contact him with results    Orders:    Albumin-Creatinine Ratio, Urine Random; Future    Basic Metabolic Panel; Future    Hemoglobin A1C; Future    glipiZIDE XL (Glucotrol XL) 10 mg 24 hr tablet; Take 1 tablet (10 mg) by mouth 2 times a day.    metFORMIN (Glucophage) 500 mg tablet; Take 2 tablets (1,000 mg) by mouth 2 times a day.    Referral to Podiatry; Future    Secondary hypertension    Orders:    hydroCHLOROthiazide (HYDRODiuril) 25 mg tablet; Take 1 tablet (25 mg) by mouth once daily.    losartan (Cozaar) 50 mg tablet; Take 1 tablet (50 mg) by mouth once daily.    metoprolol succinate XL (Toprol-XL) 50 mg 24 hr tablet; Take 1 tablet (50 mg) by mouth once daily.    Toenail deformity  -I am sending him to podiatry since he is having difficulties trimming his nails and has toenail deformity  -Because he is diabetic another reason to have him go    Orders:    Referral to Podiatry; Future    Diastolic heart failure,  unspecified HF chronicity  -Stable at this time and we will continue to monitor         Body mass index (BMI) 50.0-59.9, adult (Multi)  -We remind him to eat a healthy diet, exercise regularly, and try to get his close to ideal body weight as possible         Rising PSA level  -His PSA came back low at this time so we will go back to checking once a year       Patient instructions  As we discussed I have placed a referral for you to see podiatry and please let us know if you do not hear from us within 5 business days to get scheduled  Please keep up the fantastic work with your diabetes control and please remember to try to do some form of exercise daily.  I think that chair exercises would work well for you and the goal is to try to build up to 30 minutes 5 days a week  Please also remember that you should complete your power of  for healthcare and living will.  Once these are completed they need to be notarized and we would like a copy for your chart here  Please remember to put warm clothing on your knee when you go outside  We will have you stop on the way out today submit a urine specimen to check for protein  We will see you back in 6 months for another checkup and please remember to get fasting lab work

## 2025-01-16 NOTE — ASSESSMENT & PLAN NOTE
Orders:    hydroCHLOROthiazide (HYDRODiuril) 25 mg tablet; Take 1 tablet (25 mg) by mouth once daily.    losartan (Cozaar) 50 mg tablet; Take 1 tablet (50 mg) by mouth once daily.    metoprolol succinate XL (Toprol-XL) 50 mg 24 hr tablet; Take 1 tablet (50 mg) by mouth once daily.

## 2025-01-16 NOTE — ASSESSMENT & PLAN NOTE
-Blood pressure is under adequate control and we will continue with his current antihypertensive regimen    Orders:    amLODIPine (Norvasc) 10 mg tablet; Take 1 tablet (10 mg) by mouth once daily.

## 2025-01-16 NOTE — ASSESSMENT & PLAN NOTE
-We will check cholesterol prior to his next visit    Orders:    Lipid Panel; Future    atorvastatin (Lipitor) 40 mg tablet; Take 1 tablet (40 mg) by mouth once daily.

## 2025-01-16 NOTE — ASSESSMENT & PLAN NOTE
-We discussed fall prevention and I sent a handout to him via [employer] on ways to reduce his risk of falls  -We discussed the importance of routine exercise and I have suggested chair exercises  -We discussed completing his advance directives and providing a copy here

## 2025-01-16 NOTE — ASSESSMENT & PLAN NOTE
-I am sending him to podiatry since he is having difficulties trimming his nails and has toenail deformity  -Because he is diabetic another reason to have him go    Orders:    Referral to Podiatry; Future

## 2025-06-16 DIAGNOSIS — I15.9 SECONDARY HYPERTENSION: ICD-10-CM

## 2025-06-16 RX ORDER — METOPROLOL SUCCINATE 50 MG/1
50 TABLET, EXTENDED RELEASE ORAL DAILY
Qty: 100 TABLET | Refills: 0 | Status: SHIPPED | OUTPATIENT
Start: 2025-06-16

## 2025-07-12 LAB
ANION GAP SERPL CALCULATED.4IONS-SCNC: 13 MMOL/L (CALC) (ref 7–17)
BUN SERPL-MCNC: 22 MG/DL (ref 7–25)
BUN/CREAT SERPL: ABNORMAL (CALC) (ref 6–22)
CALCIUM SERPL-MCNC: 9.5 MG/DL (ref 8.6–10.3)
CHLORIDE SERPL-SCNC: 103 MMOL/L (ref 98–110)
CHOLEST SERPL-MCNC: 131 MG/DL
CHOLEST/HDLC SERPL: 2.9 (CALC)
CO2 SERPL-SCNC: 23 MMOL/L (ref 20–32)
CREAT SERPL-MCNC: 0.85 MG/DL (ref 0.7–1.35)
EGFRCR SERPLBLD CKD-EPI 2021: 96 ML/MIN/1.73M2
EST. AVERAGE GLUCOSE BLD GHB EST-MCNC: 174 MG/DL
EST. AVERAGE GLUCOSE BLD GHB EST-SCNC: 9.7 MMOL/L
GLUCOSE SERPL-MCNC: 133 MG/DL (ref 65–99)
HBA1C MFR BLD: 7.7 %
HDLC SERPL-MCNC: 45 MG/DL
LDLC SERPL CALC-MCNC: 66 MG/DL (CALC)
NONHDLC SERPL-MCNC: 86 MG/DL (CALC)
POTASSIUM SERPL-SCNC: 4.2 MMOL/L (ref 3.5–5.3)
SODIUM SERPL-SCNC: 139 MMOL/L (ref 135–146)
TRIGL SERPL-MCNC: 115 MG/DL

## 2025-07-16 ENCOUNTER — APPOINTMENT (OUTPATIENT)
Age: 65
End: 2025-07-16
Payer: COMMERCIAL

## 2025-07-16 VITALS
DIASTOLIC BLOOD PRESSURE: 64 MMHG | OXYGEN SATURATION: 96 % | HEART RATE: 86 BPM | SYSTOLIC BLOOD PRESSURE: 126 MMHG | WEIGHT: 315 LBS | BODY MASS INDEX: 45.1 KG/M2 | HEIGHT: 70 IN

## 2025-07-16 DIAGNOSIS — N52.9 ERECTILE DYSFUNCTION, UNSPECIFIED ERECTILE DYSFUNCTION TYPE: ICD-10-CM

## 2025-07-16 DIAGNOSIS — E11.9 TYPE 2 DIABETES MELLITUS WITHOUT COMPLICATION, WITHOUT LONG-TERM CURRENT USE OF INSULIN: ICD-10-CM

## 2025-07-16 DIAGNOSIS — E78.5 HYPERLIPIDEMIA, UNSPECIFIED HYPERLIPIDEMIA TYPE: ICD-10-CM

## 2025-07-16 DIAGNOSIS — I10 HTN (HYPERTENSION), BENIGN: ICD-10-CM

## 2025-07-16 DIAGNOSIS — G47.33 SEVERE OBSTRUCTIVE SLEEP APNEA: Primary | ICD-10-CM

## 2025-07-16 DIAGNOSIS — I15.9 SECONDARY HYPERTENSION: ICD-10-CM

## 2025-07-16 PROBLEM — Z00.00 MEDICARE ANNUAL WELLNESS VISIT, SUBSEQUENT: Status: RESOLVED | Noted: 2023-10-13 | Resolved: 2025-07-16

## 2025-07-16 PROBLEM — R06.00 DYSPNEA: Status: RESOLVED | Noted: 2023-07-11 | Resolved: 2025-07-16

## 2025-07-16 PROCEDURE — 4010F ACE/ARB THERAPY RXD/TAKEN: CPT | Performed by: INTERNAL MEDICINE

## 2025-07-16 PROCEDURE — 1036F TOBACCO NON-USER: CPT | Performed by: INTERNAL MEDICINE

## 2025-07-16 PROCEDURE — 3074F SYST BP LT 130 MM HG: CPT | Performed by: INTERNAL MEDICINE

## 2025-07-16 PROCEDURE — 3078F DIAST BP <80 MM HG: CPT | Performed by: INTERNAL MEDICINE

## 2025-07-16 PROCEDURE — G2211 COMPLEX E/M VISIT ADD ON: HCPCS | Performed by: INTERNAL MEDICINE

## 2025-07-16 PROCEDURE — 3008F BODY MASS INDEX DOCD: CPT | Performed by: INTERNAL MEDICINE

## 2025-07-16 PROCEDURE — 99214 OFFICE O/P EST MOD 30 MIN: CPT | Performed by: INTERNAL MEDICINE

## 2025-07-16 RX ORDER — HYDROCHLOROTHIAZIDE 25 MG/1
25 TABLET ORAL DAILY
Qty: 100 TABLET | Refills: 1 | Status: SHIPPED | OUTPATIENT
Start: 2025-07-16

## 2025-07-16 RX ORDER — GLIPIZIDE 10 MG/1
10 TABLET, FILM COATED, EXTENDED RELEASE ORAL 2 TIMES DAILY
Qty: 200 TABLET | Refills: 1 | Status: SHIPPED | OUTPATIENT
Start: 2025-07-16

## 2025-07-16 RX ORDER — ATORVASTATIN CALCIUM 40 MG/1
40 TABLET, FILM COATED ORAL DAILY
Qty: 100 TABLET | Refills: 1 | Status: SHIPPED | OUTPATIENT
Start: 2025-07-16

## 2025-07-16 RX ORDER — SILDENAFIL 50 MG/1
50 TABLET, FILM COATED ORAL DAILY PRN
Qty: 10 TABLET | Refills: 0 | Status: SHIPPED | OUTPATIENT
Start: 2025-07-16 | End: 2025-08-15

## 2025-07-16 RX ORDER — METFORMIN HYDROCHLORIDE 500 MG/1
1000 TABLET ORAL 2 TIMES DAILY
Qty: 400 TABLET | Refills: 1 | Status: SHIPPED | OUTPATIENT
Start: 2025-07-16

## 2025-07-16 RX ORDER — METOPROLOL SUCCINATE 50 MG/1
50 TABLET, EXTENDED RELEASE ORAL DAILY
Qty: 100 TABLET | Refills: 1 | Status: SHIPPED | OUTPATIENT
Start: 2025-07-16

## 2025-07-16 RX ORDER — AMLODIPINE BESYLATE 10 MG/1
10 TABLET ORAL DAILY
Qty: 100 TABLET | Refills: 1 | Status: SHIPPED | OUTPATIENT
Start: 2025-07-16

## 2025-07-16 RX ORDER — LOSARTAN POTASSIUM 50 MG/1
50 TABLET ORAL DAILY
Qty: 100 TABLET | Refills: 1 | Status: SHIPPED | OUTPATIENT
Start: 2025-07-16

## 2025-07-16 ASSESSMENT — ENCOUNTER SYMPTOMS
ABDOMINAL PAIN: 0
FATIGUE: 0
WHEEZING: 0
VOMITING: 0
NAUSEA: 0
ARTHRALGIAS: 0
SHORTNESS OF BREATH: 0
BLOOD IN STOOL: 0
BACK PAIN: 0
DIARRHEA: 0
PALPITATIONS: 0
CHEST TIGHTNESS: 0
COUGH: 0

## 2025-07-16 ASSESSMENT — PATIENT HEALTH QUESTIONNAIRE - PHQ9
2. FEELING DOWN, DEPRESSED OR HOPELESS: NOT AT ALL
1. LITTLE INTEREST OR PLEASURE IN DOING THINGS: NOT AT ALL
SUM OF ALL RESPONSES TO PHQ9 QUESTIONS 1 AND 2: 0

## 2025-07-16 NOTE — PATIENT INSTRUCTIONS
Patient instructions  As we discussed I would like for you to continue checking your blood pressure periodically to make sure that it remains under good control.  Please call if you are having unusually high blood pressure readings  Please also keep watching your blood glucose levels and when you return in 6 months we will recheck your hemoglobin A1c and check a urine for protein  I have sent refills for all your medicines and please do not hesitate to reach out if you have any concerns.

## 2025-07-16 NOTE — PROGRESS NOTES
Subjective   Patient ID: Qasim Simon is a 64 y.o. male who presents for Follow-up (6 MO CK).  HPI  He is here today for his routine 6-month checkup.  When he arrived his blood pressure was a bit generous but he had just walked in from the parking lot and after sitting for a while his numbers did come down.  He states he routinely checks his blood pressure at home and gets systolics in the low 130s and diastolics in the 70s.  We do ask that he continue checking his blood pressure periodically when he has the opportunity to sit and relax for 10 to 20 minutes.  We also conducted a full review of systems and went over the results of recent lab work.  His kidney function is great.  His cholesterol profile is excellent and we will check that once a year.  Unfortunately his hemoglobin A1c did creep up to 7.7.  He states that for a while when he was ill his blood sugars were high but now they are back to baseline.  He is also lost an estimated 5 pounds and he continues to work hard on lowering his weight naturally.  We had a brief conversation about the weight loss drugs but he is not comfortable with them and I think that the way he is losing weight is a great way to go.  We will encourage him to stay on track and we will monitor.  He also continues to wear his CPAP device faithfully and has derived benefit from its use  He also sees urology for his regular checkups  We will see him back in 6 months for another checkup  Review of Systems   Constitutional:  Negative for fatigue.   Respiratory:  Negative for cough, chest tightness, shortness of breath and wheezing.    Cardiovascular:  Negative for chest pain, palpitations and leg swelling.   Gastrointestinal:  Negative for abdominal pain, blood in stool, diarrhea, nausea and vomiting.   Musculoskeletal:  Negative for arthralgias and back pain.     Objective   Physical Exam  Vitals and nursing note reviewed.   Constitutional:       General: He is not in acute  distress.     Appearance: Normal appearance.   HENT:      Head: Normocephalic and atraumatic.     Eyes:      Conjunctiva/sclera: Conjunctivae normal.       Cardiovascular:      Rate and Rhythm: Normal rate and regular rhythm.      Heart sounds: Normal heart sounds.   Pulmonary:      Effort: No respiratory distress.      Breath sounds: No wheezing.   Abdominal:      Palpations: Abdomen is soft.      Tenderness: There is no abdominal tenderness. There is no guarding.     Musculoskeletal:         General: No swelling. Normal range of motion.     Skin:     General: Skin is warm and dry.     Neurological:      General: No focal deficit present.      Mental Status: He is alert and oriented to person, place, and time.     Psychiatric:         Behavior: Behavior normal.       Recent Results (from the past 5 weeks)   Lipid Panel    Collection Time: 07/11/25  7:27 AM   Result Value Ref Range    CHOLESTEROL, TOTAL 131 <200 mg/dL    HDL CHOLESTEROL 45 > OR = 40 mg/dL    TRIGLYCERIDES 115 <150 mg/dL    LDL-CHOLESTEROL 66 mg/dL (calc)    CHOL/HDLC RATIO 2.9 <5.0 (calc)    NON HDL CHOLESTEROL 86 <130 mg/dL (calc)   Basic Metabolic Panel    Collection Time: 07/11/25  7:27 AM   Result Value Ref Range    GLUCOSE 133 (H) 65 - 99 mg/dL    UREA NITROGEN (BUN) 22 7 - 25 mg/dL    CREATININE 0.85 0.70 - 1.35 mg/dL    EGFR 96 > OR = 60 mL/min/1.73m2    BUN/CREATININE RATIO SEE NOTE: 6 - 22 (calc)    SODIUM 139 135 - 146 mmol/L    POTASSIUM 4.2 3.5 - 5.3 mmol/L    CHLORIDE 103 98 - 110 mmol/L    CARBON DIOXIDE 23 20 - 32 mmol/L    ELECTROLYTE BALANCE 13 7 - 17 mmol/L (calc)    CALCIUM 9.5 8.6 - 10.3 mg/dL   Hemoglobin A1C    Collection Time: 07/11/25  7:27 AM   Result Value Ref Range    HEMOGLOBIN A1c 7.7 (H) <5.7 %    eAG (mg/dL) 174 mg/dL    eAG (mmol/L) 9.7 mmol/L       Assessment/Plan   Problem List Items Addressed This Visit           ICD-10-CM    Severe obstructive sleep apnea - Primary G47.33    - He reports wearing his CPAP device  faithfully and has derived benefit from its use         Hyperlipidemia E78.5    - His cholesterol profile is excellent and we will check this again in 1 year         Relevant Medications    atorvastatin (Lipitor) 40 mg tablet    sildenafil (Viagra) 50 mg tablet    RESOLVED: HTN (hypertension), benign I10    Relevant Medications    amLODIPine (Norvasc) 10 mg tablet    hydroCHLOROthiazide (HYDRODiuril) 25 mg tablet    losartan (Cozaar) 50 mg tablet    metoprolol succinate XL (Toprol-XL) 50 mg 24 hr tablet    Other Relevant Orders    Basic Metabolic Panel    Type 2 diabetes mellitus without complication, without long-term current use of insulin E11.9    - Hemoglobin A1c went up to 7.7 but he checks his sugars regularly and they are better in recent times.  He will continue to watch his diet closely and maintain an active lifestyle  -We will see him back in 6 months for reevaluation         Relevant Medications    atorvastatin (Lipitor) 40 mg tablet    glipiZIDE XL (Glucotrol XL) 10 mg 24 hr tablet    losartan (Cozaar) 50 mg tablet    metFORMIN (Glucophage) 500 mg tablet    Other Relevant Orders    Hemoglobin A1C    Albumin-Creatinine Ratio, Urine Random    Secondary hypertension I15.9    - He will continue to monitor blood pressures at home periodically and call with any abnormal values  -We will continue with his current antihypertensive regimen         Relevant Medications    amLODIPine (Norvasc) 10 mg tablet    hydroCHLOROthiazide (HYDRODiuril) 25 mg tablet    losartan (Cozaar) 50 mg tablet    metoprolol succinate XL (Toprol-XL) 50 mg 24 hr tablet    Erectile dysfunction N52.9    Relevant Medications    sildenafil (Viagra) 50 mg tablet   Patient instructions  As we discussed I would like for you to continue checking your blood pressure periodically to make sure that it remains under good control.  Please call if you are having unusually high blood pressure readings  Please also keep watching your blood glucose  levels and when you return in 6 months we will recheck your hemoglobin A1c and check a urine for protein  I have sent refills for all your medicines and please do not hesitate to reach out if you have any concerns.       Ada Juarez, DO

## 2025-07-16 NOTE — ASSESSMENT & PLAN NOTE
- Hemoglobin A1c went up to 7.7 but he checks his sugars regularly and they are better in recent times.  He will continue to watch his diet closely and maintain an active lifestyle  -We will see him back in 6 months for reevaluation

## 2025-07-16 NOTE — ASSESSMENT & PLAN NOTE
- He will continue to monitor blood pressures at home periodically and call with any abnormal values  -We will continue with his current antihypertensive regimen

## 2025-11-04 ENCOUNTER — APPOINTMENT (OUTPATIENT)
Dept: CARDIOLOGY | Facility: CLINIC | Age: 65
End: 2025-11-04
Payer: COMMERCIAL

## 2026-01-21 ENCOUNTER — APPOINTMENT (OUTPATIENT)
Age: 66
End: 2026-01-21
Payer: COMMERCIAL